# Patient Record
Sex: FEMALE | Race: WHITE | NOT HISPANIC OR LATINO | Employment: FULL TIME | URBAN - METROPOLITAN AREA
[De-identification: names, ages, dates, MRNs, and addresses within clinical notes are randomized per-mention and may not be internally consistent; named-entity substitution may affect disease eponyms.]

---

## 2017-02-05 RX ORDER — GABAPENTIN 300 MG/1
CAPSULE ORAL
Qty: 30 CAPSULE | Refills: 3 | Status: SHIPPED | OUTPATIENT
Start: 2017-02-05 | End: 2017-06-19 | Stop reason: SDUPTHER

## 2017-06-19 RX ORDER — GABAPENTIN 300 MG/1
CAPSULE ORAL
Qty: 30 CAPSULE | Refills: 3 | Status: SHIPPED | OUTPATIENT
Start: 2017-06-19 | End: 2017-10-23 | Stop reason: SDUPTHER

## 2017-08-11 RX ORDER — CITALOPRAM 20 MG/1
TABLET, FILM COATED ORAL
Qty: 90 TABLET | Refills: 3 | Status: SHIPPED | OUTPATIENT
Start: 2017-08-11 | End: 2018-08-21 | Stop reason: SDUPTHER

## 2017-10-25 RX ORDER — GABAPENTIN 300 MG/1
CAPSULE ORAL
Qty: 30 CAPSULE | Refills: 3 | Status: SHIPPED | OUTPATIENT
Start: 2017-10-25 | End: 2018-03-01 | Stop reason: SDUPTHER

## 2017-11-07 ENCOUNTER — HOSPITAL ENCOUNTER (OUTPATIENT)
Dept: RADIOLOGY | Facility: HOSPITAL | Age: 57
Discharge: HOME | End: 2017-11-07
Attending: SPECIALIST
Payer: COMMERCIAL

## 2017-11-07 DIAGNOSIS — Z12.31 VISIT FOR SCREENING MAMMOGRAM: ICD-10-CM

## 2017-11-07 DIAGNOSIS — Z12.31 VISIT FOR SCREENING MAMMOGRAM: Primary | ICD-10-CM

## 2017-11-07 PROCEDURE — 77067 SCR MAMMO BI INCL CAD: CPT | Mod: TC

## 2018-02-28 ENCOUNTER — TELEPHONE (OUTPATIENT)
Dept: FAMILY MEDICINE | Facility: CLINIC | Age: 58
End: 2018-02-28

## 2018-02-28 DIAGNOSIS — L71.9 ROSACEA: ICD-10-CM

## 2018-02-28 DIAGNOSIS — Z00.00 WELLNESS EXAMINATION: Primary | ICD-10-CM

## 2018-02-28 NOTE — TELEPHONE ENCOUNTER
----- Message from Dejah Oliveira sent at 2/28/2018  1:35 PM CST -----  Patient needs wellness blood work orders. Going to [facility] to have done prior to appt on 3/28/18

## 2018-03-01 RX ORDER — GABAPENTIN 300 MG/1
CAPSULE ORAL
Qty: 90 CAPSULE | Refills: 3 | Status: SHIPPED | OUTPATIENT
Start: 2018-03-01 | End: 2019-03-11 | Stop reason: SDUPTHER

## 2018-03-14 ENCOUNTER — OFFICE VISIT (OUTPATIENT)
Dept: FAMILY MEDICINE | Facility: CLINIC | Age: 58
End: 2018-03-14
Payer: COMMERCIAL

## 2018-03-14 VITALS
HEIGHT: 66 IN | DIASTOLIC BLOOD PRESSURE: 70 MMHG | HEART RATE: 107 BPM | TEMPERATURE: 99 F | WEIGHT: 152.13 LBS | SYSTOLIC BLOOD PRESSURE: 110 MMHG | BODY MASS INDEX: 24.45 KG/M2 | OXYGEN SATURATION: 98 %

## 2018-03-14 DIAGNOSIS — R06.83 SNORES: ICD-10-CM

## 2018-03-14 DIAGNOSIS — F41.9 ANXIETY: ICD-10-CM

## 2018-03-14 DIAGNOSIS — G47.33 OSA (OBSTRUCTIVE SLEEP APNEA): ICD-10-CM

## 2018-03-14 DIAGNOSIS — J32.9 RECURRENT SINUSITIS: Primary | ICD-10-CM

## 2018-03-14 DIAGNOSIS — R51.9 PERSISTENT HEADACHES: ICD-10-CM

## 2018-03-14 DIAGNOSIS — J30.1 CHRONIC ALLERGIC RHINITIS DUE TO POLLEN, UNSPECIFIED SEASONALITY: ICD-10-CM

## 2018-03-14 DIAGNOSIS — R53.83 FATIGUE, UNSPECIFIED TYPE: ICD-10-CM

## 2018-03-14 PROCEDURE — 99214 OFFICE O/P EST MOD 30 MIN: CPT | Mod: S$GLB,,, | Performed by: FAMILY MEDICINE

## 2018-03-14 RX ORDER — LORATADINE 10 MG/1
10 TABLET ORAL DAILY
Qty: 90 TABLET | Refills: 3 | COMMUNITY
Start: 2018-03-14 | End: 2018-06-26

## 2018-03-14 RX ORDER — BUTALBITAL, ACETAMINOPHEN AND CAFFEINE 50; 325; 40 MG/1; MG/1; MG/1
TABLET ORAL
COMMUNITY
Start: 2018-02-21 | End: 2018-03-14 | Stop reason: SDUPTHER

## 2018-03-14 RX ORDER — ESTROGENS, CONJUGATED 0.62 MG/1
0.62 TABLET, FILM COATED ORAL DAILY
COMMUNITY
Start: 2018-03-01 | End: 2023-04-03

## 2018-03-14 RX ORDER — FLUTICASONE PROPIONATE 50 MCG
2 SPRAY, SUSPENSION (ML) NASAL DAILY
Qty: 1 BOTTLE | Refills: 12 | Status: SHIPPED | OUTPATIENT
Start: 2018-03-14 | End: 2018-08-01 | Stop reason: SDUPTHER

## 2018-03-14 RX ORDER — MONTELUKAST SODIUM 10 MG/1
10 TABLET ORAL NIGHTLY
Qty: 30 TABLET | Refills: 5 | Status: SHIPPED | OUTPATIENT
Start: 2018-03-14 | End: 2018-03-29 | Stop reason: ALTCHOICE

## 2018-03-14 RX ORDER — FLUCONAZOLE 150 MG/1
TABLET ORAL
COMMUNITY
Start: 2018-02-21 | End: 2018-03-29 | Stop reason: ALTCHOICE

## 2018-03-14 RX ORDER — BUTALBITAL, ACETAMINOPHEN AND CAFFEINE 50; 325; 40 MG/1; MG/1; MG/1
1 TABLET ORAL 3 TIMES DAILY PRN
Qty: 30 TABLET | Refills: 1 | Status: SHIPPED | OUTPATIENT
Start: 2018-03-14 | End: 2019-06-11

## 2018-03-14 NOTE — PROGRESS NOTES
Subjective:      Patient ID: Verito Perez is a 57 y.o. female.    Chief Complaint: Cough; Fever; Sore Throat; Sinus Problem; and Headache    Sick, tired, headaches; acute sinus recureent; has gone to urgent  twice, abx, shots, pain face and back of head; ears itchy, sneeze some; teeth pain; worse this time of year, occurs other times too; tired; sleeps good; occ tired; snores; no sleep tests; recroded by ; nose patent, no dry mouth, able to stay awake;       Cough   Associated symptoms include a fever, headaches and a sore throat.   Fever    Associated symptoms include coughing, headaches and a sore throat.   Sore Throat    Associated symptoms include coughing and headaches.   Sinus Problem   Associated symptoms include coughing, headaches and a sore throat.   Headache    Associated symptoms include coughing, a fever and a sore throat.     Review of Systems   Constitutional: Positive for fever.   HENT: Positive for sore throat.    Respiratory: Positive for cough.    Cardiovascular: Negative.    Gastrointestinal: Negative.    Endocrine: Negative.    Genitourinary: Negative.    Musculoskeletal: Negative.    Neurological: Positive for headaches.   Psychiatric/Behavioral: Negative.    All other systems reviewed and are negative.    Objective:     Physical Exam   Constitutional: She is oriented to person, place, and time. She appears well-developed and well-nourished.   HENT:   Head: Normocephalic.   Serous otitis   Eyes: Conjunctivae and EOM are normal. Pupils are equal, round, and reactive to light.   Neck: Normal range of motion. Neck supple.   Cardiovascular: Normal rate, regular rhythm and normal heart sounds.    Pulmonary/Chest: Effort normal and breath sounds normal.   Musculoskeletal: Normal range of motion.   Neurological: She is alert and oriented to person, place, and time. She has normal reflexes.   Skin: Skin is warm and dry.   Psychiatric: She has a normal mood and affect. Her behavior is normal.  Judgment and thought content normal.   Nursing note and vitals reviewed.    Assessment:     1. Recurrent sinusitis    2. Chronic allergic rhinitis due to pollen, unspecified seasonality    3. Fatigue, unspecified type    4. Persistent headaches    5. Snores    6. Anxiety    7. TESSA (obstructive sleep apnea)      Plan:     New Prescriptions    FLUTICASONE (FLONASE) 50 MCG/ACTUATION NASAL SPRAY    2 sprays (100 mcg total) by Each Nare route once daily.    LORATADINE (CLARITIN) 10 MG TABLET    Take 1 tablet (10 mg total) by mouth once daily.    MONTELUKAST (SINGULAIR) 10 MG TABLET    Take 1 tablet (10 mg total) by mouth every evening.     Discontinued Medications    SOOLANTRA 1 % CREA        ZENATANE 40 MG CAPSULE    Take 1 capsule by mouth every other day.     Modified Medications    Modified Medication Previous Medication    BUTALBITAL-ACETAMINOPHEN-CAFFEINE -40 MG (FIORICET, ESGIC) -40 MG PER TABLET butalbital-acetaminophen-caffeine -40 mg (FIORICET, ESGIC) -40 mg per tablet       Take 1 tablet by mouth 3 (three) times daily as needed for Pain.           Recurrent sinusitis  -     Ambulatory referral to ENT    Chronic allergic rhinitis due to pollen, unspecified seasonality  -     CT Sinuses without Contrast; Future; Expected date: 03/14/2018  -     Ambulatory referral to ENT    Fatigue, unspecified type  -     Ambulatory referral to ENT    Persistent headaches  -     Ambulatory referral to ENT    Snores  -     Ambulatory referral to ENT    Anxiety    TESSA (obstructive sleep apnea)    Other orders  -     Cancel: Tdap Vaccine  -     butalbital-acetaminophen-caffeine -40 mg (FIORICET, ESGIC) -40 mg per tablet; Take 1 tablet by mouth 3 (three) times daily as needed for Pain.  Dispense: 30 tablet; Refill: 1  -     fluticasone (FLONASE) 50 mcg/actuation nasal spray; 2 sprays (100 mcg total) by Each Nare route once daily.  Dispense: 1 Bottle; Refill: 12  -     loratadine (CLARITIN) 10 mg  tablet; Take 1 tablet (10 mg total) by mouth once daily.  Dispense: 90 tablet; Refill: 3  -     montelukast (SINGULAIR) 10 mg tablet; Take 1 tablet (10 mg total) by mouth every evening.  Dispense: 30 tablet; Refill: 5

## 2018-03-20 ENCOUNTER — TELEPHONE (OUTPATIENT)
Dept: FAMILY MEDICINE | Facility: CLINIC | Age: 58
End: 2018-03-20

## 2018-03-20 NOTE — TELEPHONE ENCOUNTER
----- Message from Dejah Oliveira sent at 3/20/2018  1:09 PM CDT -----  Has a really bad cough. Constant cough w/runny nose. Taking OTC Mucinex DM along with OTC Claritin. Wants to know if you can prescribe something or give suggestion as to a different OTC medication to take?    [facility]

## 2018-03-21 RX ORDER — CODEINE PHOSPHATE AND GUAIFENESIN 10; 100 MG/5ML; MG/5ML
10 SOLUTION ORAL EVERY 4 HOURS PRN
Qty: 240 ML | Refills: 0 | Status: SHIPPED | OUTPATIENT
Start: 2018-03-21 | End: 2018-03-29 | Stop reason: ALTCHOICE

## 2018-03-21 RX ORDER — METHYLPREDNISOLONE 4 MG/1
TABLET ORAL
Qty: 1 PACKAGE | Refills: 0 | Status: SHIPPED | OUTPATIENT
Start: 2018-03-21 | End: 2018-03-29 | Stop reason: ALTCHOICE

## 2018-03-21 NOTE — TELEPHONE ENCOUNTER
I left message with details for the pt to cont otc claritin  And that cough syrup and dose pack called to [facility]

## 2018-03-23 ENCOUNTER — HOSPITAL ENCOUNTER (OUTPATIENT)
Dept: RADIOLOGY | Facility: HOSPITAL | Age: 58
Discharge: HOME | End: 2018-03-23
Attending: FAMILY MEDICINE
Payer: COMMERCIAL

## 2018-03-23 DIAGNOSIS — J30.1 CHRONIC ALLERGIC RHINITIS DUE TO POLLEN, UNSPECIFIED SEASONALITY: ICD-10-CM

## 2018-03-23 PROCEDURE — 70486 CT MAXILLOFACIAL W/O DYE: CPT | Mod: TC,PO

## 2018-03-26 ENCOUNTER — TELEPHONE (OUTPATIENT)
Dept: FAMILY MEDICINE | Facility: CLINIC | Age: 58
End: 2018-03-26

## 2018-03-26 NOTE — TELEPHONE ENCOUNTER
----- Message from Sammy Partida MD sent at 3/25/2018  6:33 PM CDT -----  Polyp in right maxillary sinus; needs to see ENT

## 2018-03-29 ENCOUNTER — OFFICE VISIT (OUTPATIENT)
Dept: FAMILY MEDICINE | Facility: CLINIC | Age: 58
End: 2018-03-29
Payer: COMMERCIAL

## 2018-03-29 VITALS
TEMPERATURE: 98 F | WEIGHT: 152.75 LBS | HEART RATE: 76 BPM | HEIGHT: 66 IN | OXYGEN SATURATION: 100 % | SYSTOLIC BLOOD PRESSURE: 110 MMHG | BODY MASS INDEX: 24.55 KG/M2 | DIASTOLIC BLOOD PRESSURE: 70 MMHG

## 2018-03-29 DIAGNOSIS — Z00.00 WELLNESS EXAMINATION: Primary | ICD-10-CM

## 2018-03-29 PROCEDURE — 99213 OFFICE O/P EST LOW 20 MIN: CPT | Mod: 25,S$GLB,, | Performed by: FAMILY MEDICINE

## 2018-03-29 PROCEDURE — 90715 TDAP VACCINE 7 YRS/> IM: CPT | Mod: S$GLB,,, | Performed by: FAMILY MEDICINE

## 2018-03-29 PROCEDURE — 90471 IMMUNIZATION ADMIN: CPT | Mod: S$GLB,,, | Performed by: FAMILY MEDICINE

## 2018-03-29 NOTE — PROGRESS NOTES
Subjective:      Patient ID: Verito Perez is a 57 y.o. female.    Chief Complaint: Annual Exam    Her for wellness, still slightly congested sees ENT 4/18/2018. Pt denies sinus pressure or headaches at this time. Pt denies dysuria or increased urinary frequency. Reviewed labs with patient      Review of Systems   Constitutional: Negative.    HENT: Positive for congestion.    Respiratory: Positive for cough.    Cardiovascular: Negative.    Gastrointestinal: Negative.    Endocrine: Negative.    Genitourinary: Negative.    Musculoskeletal: Negative.    Psychiatric/Behavioral: Negative.    All other systems reviewed and are negative.    Objective:     Physical Exam   Constitutional: She is oriented to person, place, and time. She appears well-developed and well-nourished.   HENT:   Head: Normocephalic.   Eyes: Conjunctivae and EOM are normal. Pupils are equal, round, and reactive to light.   Neck: Normal range of motion. Neck supple.   Cardiovascular: Normal rate, regular rhythm and normal heart sounds.    Pulmonary/Chest: Effort normal and breath sounds normal.   Abdominal: Soft. Bowel sounds are normal.   Musculoskeletal: Normal range of motion.   Neurological: She is alert and oriented to person, place, and time. She has normal reflexes.   Skin: Skin is warm and dry.   Psychiatric: She has a normal mood and affect. Her behavior is normal. Judgment and thought content normal.   Nursing note and vitals reviewed.    Assessment:     1. Wellness examination      Plan:     New Prescriptions    No medications on file     Discontinued Medications    FLUCONAZOLE (DIFLUCAN) 150 MG TAB        GUAIFENESIN-CODEINE 100-10 MG/5 ML (TUSSI-ORGANIDIN NR)  MG/5 ML SYRUP    Take 10 mLs by mouth every 4 (four) hours as needed for Cough.    METHYLPREDNISOLONE (MEDROL DOSEPACK) 4 MG TABLET    use as directed    MONTELUKAST (SINGULAIR) 10 MG TABLET    Take 1 tablet (10 mg total) by mouth every evening.     Modified Medications     No medications on file       Wellness examination  Comments:  labs reviewed and tetnus ordered    Other orders  -     Tdap Vaccine      RTC prn  Copy of labs provided to patient

## 2018-03-30 LAB
1,25(OH)2D SERPL-MCNC: 68 PG/ML (ref 18–72)
1,25(OH)2D2 SERPL-MCNC: <8 PG/ML
1,25(OH)2D3 SERPL-MCNC: 68 PG/ML
ALBUMIN SERPL-MCNC: 3.9 G/DL (ref 3.6–5.1)
ALBUMIN/GLOB SERPL: 1.1 (CALC) (ref 1–2.5)
ALP SERPL-CCNC: 76 U/L (ref 33–130)
ALT SERPL-CCNC: 14 U/L (ref 6–29)
APPEARANCE UR: ABNORMAL
AST SERPL-CCNC: 14 U/L (ref 10–35)
BASOPHILS # BLD AUTO: 22 CELLS/UL (ref 0–200)
BASOPHILS NFR BLD AUTO: 0.3 %
BILIRUB SERPL-MCNC: 0.3 MG/DL (ref 0.2–1.2)
BILIRUB UR QL STRIP: NEGATIVE
BUN SERPL-MCNC: 18 MG/DL (ref 7–25)
BUN/CREAT SERPL: NORMAL (CALC) (ref 6–22)
CALCIUM SERPL-MCNC: 9.4 MG/DL (ref 8.6–10.4)
CHLORIDE SERPL-SCNC: 105 MMOL/L (ref 98–110)
CHOLEST SERPL-MCNC: 153 MG/DL
CHOLEST/HDLC SERPL: 2.3 (CALC)
CO2 SERPL-SCNC: 29 MMOL/L (ref 20–31)
COLOR UR: YELLOW
CREAT SERPL-MCNC: 0.85 MG/DL (ref 0.5–1.05)
EOSINOPHIL # BLD AUTO: 73 CELLS/UL (ref 15–500)
EOSINOPHIL NFR BLD AUTO: 1 %
ERYTHROCYTE [DISTWIDTH] IN BLOOD BY AUTOMATED COUNT: 12.4 % (ref 11–15)
GFR SERPL CREATININE-BSD FRML MDRD: 77 ML/MIN/1.73M2
GLOBULIN SER CALC-MCNC: 3.5 G/DL (CALC) (ref 1.9–3.7)
GLUCOSE SERPL-MCNC: 97 MG/DL (ref 65–99)
GLUCOSE UR QL STRIP: NEGATIVE
HCT VFR BLD AUTO: 46.3 % (ref 35–45)
HCV AB S/CO SERPL IA: 0.04
HCV AB SERPL QL IA: NORMAL
HDLC SERPL-MCNC: 67 MG/DL
HGB BLD-MCNC: 15 G/DL (ref 11.7–15.5)
HGB UR QL STRIP: NEGATIVE
KETONES UR QL STRIP: NEGATIVE
LDLC SERPL CALC-MCNC: 69 MG/DL (CALC)
LEUKOCYTE ESTERASE UR QL STRIP: ABNORMAL
LYMPHOCYTES # BLD AUTO: 2314 CELLS/UL (ref 850–3900)
LYMPHOCYTES NFR BLD AUTO: 31.7 %
MCH RBC QN AUTO: 27.8 PG (ref 27–33)
MCHC RBC AUTO-ENTMCNC: 32.4 G/DL (ref 32–36)
MCV RBC AUTO: 85.9 FL (ref 80–100)
MONOCYTES # BLD AUTO: 518 CELLS/UL (ref 200–950)
MONOCYTES NFR BLD AUTO: 7.1 %
NEUTROPHILS # BLD AUTO: 4373 CELLS/UL (ref 1500–7800)
NEUTROPHILS NFR BLD AUTO: 59.9 %
NITRITE UR QL STRIP: NEGATIVE
NONHDLC SERPL-MCNC: 86 MG/DL (CALC)
PH UR STRIP: 5.5 [PH] (ref 5–8)
PLATELET # BLD AUTO: 222 THOUSAND/UL (ref 140–400)
PMV BLD REES-ECKER: 12.7 FL (ref 7.5–12.5)
POTASSIUM SERPL-SCNC: 4.6 MMOL/L (ref 3.5–5.3)
PROT SERPL-MCNC: 7.4 G/DL (ref 6.1–8.1)
PROT UR QL STRIP: NEGATIVE
RBC # BLD AUTO: 5.39 MILLION/UL (ref 3.8–5.1)
SODIUM SERPL-SCNC: 141 MMOL/L (ref 135–146)
SP GR UR STRIP: 1.02 (ref 1–1.03)
TRIGL SERPL-MCNC: 83 MG/DL
TSH SERPL-ACNC: 2.72 MIU/L
WBC # BLD AUTO: 7.3 THOUSAND/UL (ref 3.8–10.8)

## 2018-04-18 ENCOUNTER — OFFICE VISIT (OUTPATIENT)
Dept: OTOLARYNGOLOGY | Facility: CLINIC | Age: 58
End: 2018-04-18
Payer: COMMERCIAL

## 2018-04-18 VITALS
HEART RATE: 77 BPM | SYSTOLIC BLOOD PRESSURE: 110 MMHG | WEIGHT: 153.69 LBS | TEMPERATURE: 98 F | HEIGHT: 66 IN | DIASTOLIC BLOOD PRESSURE: 81 MMHG | BODY MASS INDEX: 24.7 KG/M2

## 2018-04-18 DIAGNOSIS — J30.89 CHRONIC NON-SEASONAL ALLERGIC RHINITIS, UNSPECIFIED TRIGGER: ICD-10-CM

## 2018-04-18 DIAGNOSIS — J32.8 OTHER CHRONIC SINUSITIS: Primary | ICD-10-CM

## 2018-04-18 DIAGNOSIS — J33.8 MAXILLARY SINUS POLYP: ICD-10-CM

## 2018-04-18 DIAGNOSIS — J34.3 HYPERTROPHY OF INFERIOR NASAL TURBINATE: ICD-10-CM

## 2018-04-18 DIAGNOSIS — J34.2 NASAL SEPTAL DEVIATION: ICD-10-CM

## 2018-04-18 PROCEDURE — 99999 PR PBB SHADOW E&M-EST. PATIENT-LVL III: CPT | Mod: PBBFAC,,, | Performed by: OTOLARYNGOLOGY

## 2018-04-18 PROCEDURE — 99244 OFF/OP CNSLTJ NEW/EST MOD 40: CPT | Mod: 25,S$GLB,, | Performed by: OTOLARYNGOLOGY

## 2018-04-18 PROCEDURE — 31231 NASAL ENDOSCOPY DX: CPT | Mod: S$GLB,,, | Performed by: OTOLARYNGOLOGY

## 2018-04-18 NOTE — PROCEDURES
Nasal/sinus endoscopy  Date/Time: 4/18/2018 11:03 AM  Performed by: BABAK HUERTA  Authorized by: BABAK HUERTA     Consent Done?:  Yes (Verbal)    PROCEDURE NOTE:  Nasal endoscopy   Preprocedure diagnosis:  Chronic sinusitis  Postprocedure diangosis:  Same  Complications:  None  Blood Loss:  None    Procedure in detail:  After verbal consent was obtained, the patient's nasal cavity was anesthesized using topical 1%lidocaine and Neosynepherine.  A rigid 0 degree endoscope was placed in first the right, then the left nasal cavity. There is nasal septal deviation to the left with 80% obstruction.  The inferior and middle turbinates were examined, and found to be edematous and polypoid bilaterally.  The middle meatus and maxillary antrum was also examined, and found to be narrowed bilaterally.  No purulent drainage or masses seen.  The patient tolerated the procedure well and there were no complications.

## 2018-04-18 NOTE — PROGRESS NOTES
Chief Complaint   Patient presents with    Other     pt had CT scan done on 3/14/18, showed polyp in right maxillary sinus, pt referred by Dr Partida to be seen by ENT   .    HPI:     Verito Perez is a 57 y.o. female who presents for evaluation of a several year history of nasal congestion and postnasal drip. She describes worsening over the last 2 months.  She has been on 3 rounds of antibiotics in the last 2 months without resolution of her symptoms.   She describes difficulty breathing at night. There is bilateral nasal obstruction. She does use sinus rinses or nasal sprays. She admits to midface pain and pressure.  She admits to rhinorrhea and postnasal drip. There is not maxillary tooth pain. She  admits to headaches.  She has not had sinus or nasal surgery. There is no history of sinonasal trauma.      Past Medical History:   Diagnosis Date    Acne     Celiac disease     Rosacea      Social History     Social History    Marital status:      Spouse name: N/A    Number of children: N/A    Years of education: N/A     Occupational History    Not on file.     Social History Main Topics    Smoking status: Never Smoker    Smokeless tobacco: Never Used    Alcohol use Yes      Comment: occ    Drug use: Unknown    Sexual activity: Not on file     Other Topics Concern    Not on file     Social History Narrative    No narrative on file     Past Surgical History:   Procedure Laterality Date    COLONOSCOPY  4/4/13    HYSTERECTOMY      TONSILLECTOMY       Family History   Problem Relation Age of Onset    Cancer Maternal Grandmother      thyroid           Review of Systems  General: negative for chills, fever or weight loss  Psychological: negative for mood changes or depression  Ophthalmic: negative for blurry vision, photophobia or eye pain  ENT: see HPI  Respiratory: no cough, shortness of breath, or wheezing  Cardiovascular: no chest pain or dyspnea on exertion  Gastrointestinal: no  abdominal pain, change in bowel habits, or black/ bloody stools  Musculoskeletal: negative for gait disturbance or muscular weakness  Neurological: no syncope or seizures; no ataxia  Dermatological: negative for puritis,  rash and jaundice  Hematologic/lymphatic: no easy bruising, no new lumps or bumps      Physical Exam:    Vitals:    04/18/18 1000   BP: 110/81   Pulse: 77   Temp: 97.7 °F (36.5 °C)       Constitutional: Well appearing / communicating without difficutly.  NAD.  Eyes: EOM I Bilaterally  Head/Face: Normocephalic.  Negative paranasal sinus pressure/tenderness.  Salivary glands WNL.  House Brackmann I Bilaterally.    Right Ear: Auricle normal appearance. External Auditory Canal within normal limits,TM w/o masses/lesions/perforations. TM mobility noted.   Left Ear: Auricle normal appearance. External Auditory Canal WNL,TM w/o masses/lesions/perforations. TM mobility noted.  Nose: Nasal septal deviation. Inferior Turbinates 3+ bilaterally. No septal perforation. No masses/lesions. External nasal skin appears normal without masses/lesions.  Oral Cavity: Gingiva/lips within normal limits.  Dentition/gingiva healthy appearing. Mucus membranes moist. Floor of mouth soft, no masses palpated. Oral Tongue mobile. Hard Palate appears normal.    Oropharynx: Base of tongue appears normal. No masses/lesions noted. Tonsillar fossa/pharyngeal wall without lesions. Posterior oropharynx WNL.  Soft palate without masses. Midline uvula.   Neck/Lymphatic: No LAD I-VI bilaterally.  No thyromegaly.  No masses noted on exam.    Mirror laryngoscopy/nasopharyngoscopy: Active gag reflex.  Unable to perform.    Neuro/Psychiatric: AOx3.  Normal mood and affect.   Cardiovascular: Normal carotid pulses bilaterally, no increasing jugular venous distention noted at cervical region bilaterally.    Respiratory: Normal respiratory effort, no stridor, no retractions noted.      See separate procedure note for nasal endoscopy.     CT sinus  3/23/2018: Mucosal thickening is seen within the ethmoid air cells and bilateral maxillary sinuses. Large polyp or retention cyst is seen within the right maxillary sinus.      Assessment:    ICD-10-CM ICD-9-CM    1. Other chronic sinusitis J32.8 473.8    2. Nasal septal deviation J34.2 470    3. Hypertrophy of inferior nasal turbinate J34.3 478.0    4. Chronic non-seasonal allergic rhinitis, unspecified trigger J30.89 477.9      The primary encounter diagnosis was Other chronic sinusitis. Diagnoses of Nasal septal deviation, Hypertrophy of inferior nasal turbinate, and Chronic non-seasonal allergic rhinitis, unspecified trigger were also pertinent to this visit.      Plan:  No orders of the defined types were placed in this encounter.    Continue Flonase and Claritin.    She would benefit from endoscopic sinus surgery for the treatment of her condition.  This would include the ethmoid and maxillary sinuses and would be bilateral. Septoplasty and  Inferior turbinate reduction would be included.  I discussed the risks, benefits and alternatives to surgery with the patient, as well as the expected postoperative course.  I gave her the opportunity to ask questions and I answered all of them.  I provided relevant printed information on her condition for her to review at home.  Same-day discharge is anticipated.  She will need evaluation in the pre-anesthesia clinic. She will also need a CT sinuses with Stealth protocol prior to surgery.  The surgery will be scheduled in the near future.  She will need to return for a postoperative visit 1 week after surgery.    Thank you kindly for allowing me to participate in the patient's care.     Yuliya Hawkins MD

## 2018-04-18 NOTE — PATIENT INSTRUCTIONS
INSTRUCTIONS TO FOLLOW AFTER SINUS SURGERY  DR. Tauzin - [facility]      Your first office visit with Dr. Bolden after surgery should have been already scheduled.  If you dont know when it is, call Dr. Bolden's nurse Harmony at [phone].    ACTIVITY:    Sleep on your back with the head of the bead elevated, up on 2-3 pillows, or in a recliner for the first 3 to 5 days. This will help with swelling.     After surgery you may have a lot of nasal drainage. This is normal. Do not wipe, touch, or dab your nose. You may breathe through your nose if youre able but avoid inhaling forcibly. Let all drainage fall on your mustache dressing and change it as needed.    You may shower 24 hours after surgery.    If you use CPAP or BiPAP to sleep at night, you should wait at least 48 hours before resuming use.  Dr. Bolden will advise you when it is safe to do this.    DRESSINGS:    Change the mustache dressing under your nose as needed. (If unsure what this dressing is or how to do this, ask your doctor or nurse before you leave the clinic/hospital.) You may have pinkish-red drainage for 2-3 days.    In certain cases you may have gauze packing inside your nostrils.  If so, these will turn from white to red from the drainage. This is normal so do not be alarmed. Do not touch or pull at the packing. The packing will be removed by your doctor at your first post-op visit.     You may also have a dissolvable stent or dissolvable sponge placed into the sinuses during surgery.  These usually do not need to be removed unless you are told otherwise by Dr. Bolden.  You may notice small fragments of these items come out of your nose in the weeks following surgery.    MEDICATIONS:  After surgery, you are generally sent home with prescription for an antibiotic, a pain medication, and an anti-nausea medication.     Start your antibiotics when you get home from surgery and take them until they are all gone.  It is best to take them with  food to prevent an upset stomach.    Most people need prescription pain medication for the first few days after surgery.  If you find that this is not necessary, you may use over-the-counter Tylenol (Acetaminophen) instead.    Avoid Aspirin, Motrin (Ibuprofen), Advil, Aleve and Vitamin E for 1 week before surgery and 1 week after surgery. There are many other medications to avoid as well due to the fact they act as blood thinners.      Some people have problems with bowel movements after surgery. If you have NOT had a bowel movement 3-5 days after surgery, go to your local pharmacy and purchase an over the counter stool softener such as COLACE. You can also ask the pharmacist for his or her recommendation. If you still do not have a bowel movement after starting the softener, please call Dr. Ortega office.    You will need these over-the-counter medications after surgery:    Saline Sinus Rinse ([employer] brand):  You will use this to rinse out your nose and sinuses after surgery.  Begin doing this two days after surgery, unless instructed otherwise by Dr. Bolden.  You should do this 2 times a day, following the instructions on the box.    Afrin (regular strength): Only use if you have nasal congestion or bleeding. Use 2 times per day for 3 days, stop for 1 day, continue 2 times per day for 3 days, then stop completely.  NOTE:  You may not need to do this at all.      RESTRICTED ACTIVITIES AFTER SURGERY:    Do NOT blow your nose for 2 weeks. If you have to sneeze or cough, do so with your mouth open.   AVOID all heavy lifting, straining or bending for 2 weeks.   AVOID any sexual activity for 2 weeks after surgery.  AVOID semi-contact sports or vigorous exercising for 3-4 weeks. Dr. Bolden will let you know when you are cleared to resume exercise.  No Swimming for 3-4 weeks.  No Flying for 2 weeks.    Do NOT operate a motor vehicle or any type of heavy machinery within 24 hours of taking pain medication.  DO NOT  smoke or be around smokers.  AVOID irritating substances such as dust, chalk, harsh chemicals, and allergic triggers.    DIET:    Avoid hot and spicy foods, or salty soups for 1 week after surgery.  Begin with bland foods the day after surgery and advance to your regular diet as tolerated.  It is not necessary to take only soft food unless you are recovering from tonsil surgery.  Drink plenty of fluids (water is best).   Avoid alcoholic and caffeinated beverages for 1 week after surgery.

## 2018-04-19 ENCOUNTER — TELEPHONE (OUTPATIENT)
Dept: OTOLARYNGOLOGY | Facility: CLINIC | Age: 58
End: 2018-04-19

## 2018-04-19 DIAGNOSIS — J32.8 OTHER CHRONIC SINUSITIS: Primary | ICD-10-CM

## 2018-06-04 PROBLEM — Z00.00 WELLNESS EXAMINATION: Status: RESOLVED | Noted: 2018-02-28 | Resolved: 2018-06-04

## 2018-06-05 ENCOUNTER — OFFICE VISIT (OUTPATIENT)
Dept: OTOLARYNGOLOGY | Facility: CLINIC | Age: 58
End: 2018-06-05
Payer: COMMERCIAL

## 2018-06-05 ENCOUNTER — TELEPHONE (OUTPATIENT)
Dept: OTOLARYNGOLOGY | Facility: CLINIC | Age: 58
End: 2018-06-05

## 2018-06-05 ENCOUNTER — CLINICAL SUPPORT (OUTPATIENT)
Dept: LAB | Facility: HOSPITAL | Age: 58
End: 2018-06-05
Attending: OTOLARYNGOLOGY
Payer: COMMERCIAL

## 2018-06-05 ENCOUNTER — HOSPITAL ENCOUNTER (OUTPATIENT)
Dept: PREADMISSION TESTING | Facility: HOSPITAL | Age: 58
Discharge: HOME | End: 2018-06-05
Attending: OTOLARYNGOLOGY
Payer: COMMERCIAL

## 2018-06-05 ENCOUNTER — ANESTHESIA EVENT (OUTPATIENT)
Dept: SURGERY | Facility: HOSPITAL | Age: 58
End: 2018-06-05
Payer: COMMERCIAL

## 2018-06-05 VITALS
DIASTOLIC BLOOD PRESSURE: 76 MMHG | HEIGHT: 66 IN | BODY MASS INDEX: 24.85 KG/M2 | HEART RATE: 56 BPM | TEMPERATURE: 98 F | RESPIRATION RATE: 16 BRPM | SYSTOLIC BLOOD PRESSURE: 137 MMHG | OXYGEN SATURATION: 100 % | WEIGHT: 154.63 LBS

## 2018-06-05 VITALS
HEIGHT: 66 IN | BODY MASS INDEX: 24.96 KG/M2 | HEART RATE: 52 BPM | TEMPERATURE: 97 F | DIASTOLIC BLOOD PRESSURE: 60 MMHG | SYSTOLIC BLOOD PRESSURE: 110 MMHG | WEIGHT: 155.31 LBS

## 2018-06-05 DIAGNOSIS — J34.3 HYPERTROPHY OF INFERIOR NASAL TURBINATE: ICD-10-CM

## 2018-06-05 DIAGNOSIS — Z01.818 PRE-OP EXAM: Primary | ICD-10-CM

## 2018-06-05 DIAGNOSIS — Z01.818 PRE-OP EXAM: ICD-10-CM

## 2018-06-05 DIAGNOSIS — J33.8 MAXILLARY SINUS POLYP: ICD-10-CM

## 2018-06-05 DIAGNOSIS — J01.10 SUBACUTE FRONTAL SINUSITIS: ICD-10-CM

## 2018-06-05 DIAGNOSIS — J32.8 OTHER CHRONIC SINUSITIS: Primary | ICD-10-CM

## 2018-06-05 DIAGNOSIS — J34.2 NASAL SEPTAL DEVIATION: ICD-10-CM

## 2018-06-05 PROCEDURE — 99213 OFFICE O/P EST LOW 20 MIN: CPT | Mod: 25,S$GLB,, | Performed by: OTOLARYNGOLOGY

## 2018-06-05 PROCEDURE — 93005 ELECTROCARDIOGRAM TRACING: CPT

## 2018-06-05 PROCEDURE — 31231 NASAL ENDOSCOPY DX: CPT | Mod: S$GLB,,, | Performed by: OTOLARYNGOLOGY

## 2018-06-05 PROCEDURE — 93010 EKG 12-LEAD: ICD-10-PCS | Mod: ,,, | Performed by: INTERNAL MEDICINE

## 2018-06-05 PROCEDURE — 99999 PR PBB SHADOW E&M-EST. PATIENT-LVL III: CPT | Mod: PBBFAC,,, | Performed by: OTOLARYNGOLOGY

## 2018-06-05 PROCEDURE — 93010 ELECTROCARDIOGRAM REPORT: CPT | Mod: ,,, | Performed by: INTERNAL MEDICINE

## 2018-06-05 PROCEDURE — 3008F BODY MASS INDEX DOCD: CPT | Mod: CPTII,S$GLB,, | Performed by: OTOLARYNGOLOGY

## 2018-06-05 RX ORDER — SODIUM CHLORIDE, SODIUM LACTATE, POTASSIUM CHLORIDE, CALCIUM CHLORIDE 600; 310; 30; 20 MG/100ML; MG/100ML; MG/100ML; MG/100ML
INJECTION, SOLUTION INTRAVENOUS CONTINUOUS
Status: CANCELLED | OUTPATIENT
Start: 2018-06-05

## 2018-06-05 RX ORDER — IBUPROFEN 200 MG
200 TABLET ORAL EVERY 6 HOURS PRN
COMMUNITY
End: 2023-04-03

## 2018-06-05 RX ORDER — LIDOCAINE HYDROCHLORIDE 10 MG/ML
1 INJECTION, SOLUTION EPIDURAL; INFILTRATION; INTRACAUDAL; PERINEURAL ONCE
Status: CANCELLED | OUTPATIENT
Start: 2018-06-05 | End: 2018-06-05

## 2018-06-05 RX ORDER — AZITHROMYCIN 250 MG/1
TABLET, FILM COATED ORAL
Qty: 6 TABLET | Refills: 0 | Status: SHIPPED | OUTPATIENT
Start: 2018-06-05 | End: 2018-07-03

## 2018-06-05 RX ORDER — AMOXICILLIN AND CLAVULANATE POTASSIUM 875; 125 MG/1; MG/1
1 TABLET, FILM COATED ORAL 2 TIMES DAILY
Qty: 28 TABLET | Refills: 0 | Status: SHIPPED | OUTPATIENT
Start: 2018-06-05 | End: 2018-06-19

## 2018-06-05 RX ORDER — METHYLPREDNISOLONE 4 MG/1
TABLET ORAL
Qty: 1 PACKAGE | Refills: 0 | Status: SHIPPED | OUTPATIENT
Start: 2018-06-05 | End: 2018-06-26

## 2018-06-05 NOTE — PROGRESS NOTES
Chief Complaint   Patient presents with    Follow-up    Nasal Congestion     slightly improved, post nasal drip    Headache     left side   .    HPI:     Verito Perez is a 57 y.o. female who presents for evaluation of a several year history of nasal congestion and postnasal drip. She describes worsening over the last 2 months.  She has been on 3 rounds of antibiotics in the last 2 months without resolution of her symptoms.   She describes difficulty breathing at night. There is bilateral nasal obstruction. She does use sinus rinses or nasal sprays. She admits to midface pain and pressure.  She admits to rhinorrhea and postnasal drip. There is not maxillary tooth pain. She  admits to headaches.  She has not had sinus or nasal surgery. There is no history of sinonasal trauma.    Interval HPI:  Mrs. Perez presents in follow up for CRS. She reports that she has had worsening nasal congestion, post-nasal drip, and left sided headache. She feels the headache on the left frontal and maxillary region.  It has gradually worsenened.  She has used over the counter advil with some relief. She also has upcoming ESS scheduled.       Past Medical History:   Diagnosis Date    Acne     Celiac disease     Rosacea      Social History     Social History    Marital status:      Spouse name: N/A    Number of children: N/A    Years of education: N/A     Occupational History    Not on file.     Social History Main Topics    Smoking status: Never Smoker    Smokeless tobacco: Never Used    Alcohol use Yes      Comment: occ    Drug use: Unknown    Sexual activity: Not on file     Other Topics Concern    Not on file     Social History Narrative    No narrative on file     Past Surgical History:   Procedure Laterality Date    COLONOSCOPY  4/4/13    HYSTERECTOMY      TONSILLECTOMY       Family History   Problem Relation Age of Onset    Cancer Maternal Grandmother         thyroid           Review of  Systems  General: negative for chills, fever or weight loss  Psychological: negative for mood changes or depression  Ophthalmic: negative for blurry vision, photophobia or eye pain  ENT: see HPI  Respiratory: no cough, shortness of breath, or wheezing  Cardiovascular: no chest pain or dyspnea on exertion  Gastrointestinal: no abdominal pain, change in bowel habits, or black/ bloody stools  Musculoskeletal: negative for gait disturbance or muscular weakness  Neurological: no syncope or seizures; no ataxia  Dermatological: negative for puritis,  rash and jaundice  Hematologic/lymphatic: no easy bruising, no new lumps or bumps      Physical Exam:    Vitals:    06/05/18 1332   BP: 110/60   Pulse: (!) 52   Temp: 97.1 °F (36.2 °C)       Constitutional: Well appearing / communicating without difficutly.  NAD.  Eyes: EOM I Bilaterally  Head/Face: Normocephalic.  Negative paranasal sinus pressure/tenderness.  Salivary glands WNL.  House Brackmann I Bilaterally.    Right Ear: Auricle normal appearance. External Auditory Canal within normal limits,TM w/o masses/lesions/perforations. TM mobility noted.   Left Ear: Auricle normal appearance. External Auditory Canal WNL,TM w/o masses/lesions/perforations. TM mobility noted.  Nose: Nasal septal deviation. Inferior Turbinates 3+ bilaterally. No septal perforation. No masses/lesions. External nasal skin appears normal without masses/lesions.  Oral Cavity: Gingiva/lips within normal limits.  Dentition/gingiva healthy appearing. Mucus membranes moist. Floor of mouth soft, no masses palpated. Oral Tongue mobile. Hard Palate appears normal.    Oropharynx: Base of tongue appears normal. No masses/lesions noted. Tonsillar fossa/pharyngeal wall without lesions. Posterior oropharynx WNL.  Soft palate without masses. Midline uvula.   Neck/Lymphatic: No LAD I-VI bilaterally.  No thyromegaly.  No masses noted on exam.    Mirror laryngoscopy/nasopharyngoscopy: Active gag reflex.  Unable to  perform.    Neuro/Psychiatric: AOx3.  Normal mood and affect.   Cardiovascular: Normal carotid pulses bilaterally, no increasing jugular venous distention noted at cervical region bilaterally.    Respiratory: Normal respiratory effort, no stridor, no retractions noted.      See separate procedure note for nasal endoscopy.     CT sinus 3/23/2018: Mucosal thickening is seen within the ethmoid air cells and bilateral maxillary sinuses. Large polyp or retention cyst is seen within the right maxillary sinus.      Assessment:    ICD-10-CM ICD-9-CM    1. Other chronic sinusitis J32.8 473.8 CT Medtronic Sinuses without   2. Maxillary sinus polyp J33.8 471.8    3. Nasal septal deviation J34.2 470    4. Hypertrophy of inferior nasal turbinate J34.3 478.0      The primary encounter diagnosis was Other chronic sinusitis. Diagnoses of Maxillary sinus polyp, Nasal septal deviation, and Hypertrophy of inferior nasal turbinate were also pertinent to this visit.      Plan:  Orders Placed This Encounter   Procedures    CT Medtronic Sinuses without     Continue Flonase and Claritin.    Augmentin and Medrol dose arlene prescriptions given today.     We again discussed that she would benefit from endoscopic sinus surgery for the treatment of her condition.  This would include the ethmoid and maxillary sinuses and would be bilateral. Septoplasty and  Inferior turbinate reduction would be included.  I discussed the risks, benefits and alternatives to surgery with the patient, as well as the expected postoperative course.  I gave her the opportunity to ask questions and I answered all of them.  I provided relevant printed information on her condition for her to review at home.      Yuliya Hawkins MD

## 2018-06-05 NOTE — DISCHARGE INSTRUCTIONS
Your surgery is scheduled for  6/18/2018     Please report to Procedure Check-in Room on the 2nd floor at  06:45 A.M.          INSTRUCTIONS IMPORTANT!!!  ¨ Do not eat or drink after 12 midnight-including water. OK to brush teeth, no   gum, candy or mints!    ¨ Take only these medicines with a small swallow of water-morning of surgery:          ____  Please remove all jewelry, including piercings and leave at home.  ____  No money or valuables needed. Please leave at home.  ____  If going home the same day, arrange for a ride home. You will not be able to             drive if Anesthesia was used.  ____  Wear loose fitting clothing. Allow for dressings, bandages.  ____  Stop Aspirin, Ibuprofen, Motrin and Aleve at least 3-5 days before surgery, unless otherwise instructed by your doctor, or the nurse.            You MAY use Tylenol/acetaminophen until day of surgery.  ____ Stop taking any Fish Oil supplements or any Vitamins that contain Vitamin E at least 5 days prior to surgery.  ____  If you take diabetic medication, do not take am of surgery unless instructed by Doctor.  ____  Call MD for temperature above 101 degrees.  ____ Do Not wear your contact lenses the day of your procedure.  You may wear your glasses.        I have read or had read and explained to me, and understand the above information.  Additional comments or instructions:  For additional questions call [phone]            Anesthesia: General Anesthesia     You are watched continuously during your procedure by your anesthesia provider.     Youre due to have surgery. During surgery, youll be given medicine called anesthesia or anesthetic. This will keep you comfortable and pain-free. Your anesthesia provider will use general anesthesia.  What is general anesthesia?  General anesthesia puts you into a state like deep sleep. It goes into the bloodstream (IV anesthetics), into the lungs (gas anesthetics), or both. You feel nothing during the procedure.  You will not remember it. During the procedure, the anesthesia provider monitors you continuously. He or she checks your heart rate and rhythm, blood pressure, breathing, and blood oxygen.  · IV anesthetics. IV anesthetics are given through an IV line in your arm. Theyre often given first. This is so you are asleep before a gas anesthetic is started. Some kinds of IV anesthetics relieve pain. Others relax you. Your doctor will decide which kind is best in your case.  · Gas anesthetics. Gas anesthetics are breathed into the lungs. They are often used to keep you asleep. They can be given through a facemask or a tube placed in your larynx or trachea (breathing tube).  ¨ If you have a facemask, your anesthesia provider will most likely place it over your nose and mouth while youre still awake. Youll breathe oxygen through the mask as your IV anesthetic is started. Gas anesthetic may be added through the mask.  ¨ If you have a tube in the larynx or trachea, it will be inserted into your throat after youre asleep.  Anesthesia tools and medicines  You will likely have:  · IV anesthetics. These are put into an IV line into your bloodstream.  · Gas anesthetics. You breathe these anesthetics into your lungs, where they pass into your bloodstream.  · Pulse oximeter. This is a small clip that is attached to the end of your finger. This measures your blood oxygen level.  · Electrocardiography leads (electrodes). These are small sticky pads that are placed on your chest. They record your heart rate and rhythm.  · Blood pressure cuff. This reads your blood pressure.  Risks and possible complications  General anesthesia has some risks. These include:  · Breathing problems  · Nausea and vomiting  · Sore throat or hoarseness (usually temporary)  · Allergic reaction to the anesthetic  · Irregular heartbeat (rare)  · Cardiac arrest (rare)   Anesthesia safety  · Follow all instructions you are given for how long not to eat or drink  before your procedure.  · Be sure your doctor knows what medicines and drugs you take. This includes over-the-counter medicines, herbs, supplements, alcohol or other drugs. You will be asked when those were last taken.  · Have an adult family member or friend drive you home after the procedure.  · For the first 24 hours after your surgery:  ¨ Do not drive or use heavy equipment.  ¨ Do not make important decisions or sign legal documents. If important decisions or signing legal documents is necessary during the first 24 hours after surgery, have a trusted family member or spouse act on your behalf.  ¨ Avoid alcohol.  ¨ Have a responsible adult stay with you. He or she can watch for problems and help keep you safe.  Date Last Reviewed: 12/1/2016 © 2000-2017 [employer]. [address] [place] [address], [place] [address]. All rights reserved. This information is not intended as a substitute for professional medical care. Always follow your healthcare professional's instructions.

## 2018-06-05 NOTE — TELEPHONE ENCOUNTER
Spoke with patient she states when she went  her Zpack had a note to call providers office and questioning the reasoning for this. I called [facility] spoke with pharmacist asking reasoning for the note to call providers office before taking the Zpack. Pharmacist states patient is taking Celexa wanting to make sure patient doesn't have history of arrhthymias, asked patient and she reports no she doesn't. Pharmacist was notified and said taking the two medications will be fine. Let patient know and she verbalized understanding.

## 2018-06-05 NOTE — ANESTHESIA PREPROCEDURE EVALUATION
06/05/2018  Verito Perez is a 57 y.o., female is scheduled for    SINUS SURGERY FUNCTIONAL ENDOSCOPIC WITH, NAVIGATION with SEPTOPLASTY and CAUTERIZATION OF TURBINATES under [facility] on 6/18/2018.    Review of patient's allergies indicates:   Allergen Reactions    Sulfa (sulfonamide antibiotics)        Past Surgical History:   Procedure Laterality Date    COLONOSCOPY  4/4/13    HYSTERECTOMY      TONSILLECTOMY       .  Past Medical History:   Diagnosis Date    Acne     Celiac disease     Rosacea      Patient Active Problem List   Diagnosis    Rosacea    Anxiety    Subacute frontal sinusitis    Other chronic sinusitis     Wt Readings from Last 3 Encounters:   06/18/18 70.3 kg (155 lb)   06/05/18 70.1 kg (154 lb 10.4 oz)   06/05/18 70.5 kg (155 lb 5 oz)     Temp Readings from Last 3 Encounters:   06/18/18 36.7 °C (98.1 °F) (Skin)   06/05/18 36.8 °C (98.2 °F) (Oral)   06/05/18 36.2 °C (97.1 °F) (Oral)     BP Readings from Last 3 Encounters:   06/18/18 127/79   06/05/18 137/76   06/05/18 110/60     Pulse Readings from Last 3 Encounters:   06/18/18 (!) 59   06/05/18 (!) 56   06/05/18 (!) 52           Anesthesia Evaluation    I have reviewed the Patient Summary Reports.    I have reviewed the Nursing Notes.   I have reviewed the Medications.   Steroids Taken In Past Year: Prednisone    Review of Systems  Anesthesia Hx:  No problems with previous Anesthesia  History of prior surgery of interest to airway management or planning: Previous anesthesia: General, MAC  colonoscopy with MAC.   Denies Personal Hx of Anesthesia complications.   Social:  Non-Smoker, Social Alcohol Use    Hematology/Oncology:  Hematology Normal        EENT/Dental:   chronic allergic rhinitis Currently being treated for sinus infection with Medrol dose arlene and Augementin; denies fever/chills  Denies ear symptoms  Nasal Symptoms:  (chronic sinus pressure)  of runny nose   Cardiovascular:   Exercise tolerance: good Denies Hypertension.  Denies Dysrhythmias.   Denies Angina.        Pulmonary:   Denies Shortness of breath.    Renal/:  Renal/ Normal     Hepatic/GI:  Hepatic/GI Normal    Neurological:   Headaches (sinus pressure)    Endocrine:  Endocrine Normal    Psych:   anxiety depression        Lab Results   Component Value Date    WBC 7.3 03/26/2018    HGB 15.0 03/26/2018    HCT 46.3 (H) 03/26/2018    MCV 85.9 03/26/2018     03/26/2018       Chemistry        Component Value Date/Time     06/05/2018 1526    K 4.4 06/05/2018 1526     06/05/2018 1526    CO2 28 06/05/2018 1526    BUN 19 06/05/2018 1526    CREATININE 0.9 06/05/2018 1526     (H) 06/05/2018 1526        Component Value Date/Time    CALCIUM 9.6 06/05/2018 1526    ALKPHOS 76 03/26/2018 0704    AST 14 03/26/2018 0704    ALT 14 03/26/2018 0704    BILITOT 0.3 03/26/2018 0704    ESTGFRAFRICA >60 06/05/2018 1526    EGFRNONAA >60 06/05/2018 1526            Physical Exam  General:  Well nourished    Airway/Jaw/Neck:  Airway Findings: Mouth Opening: Normal Tongue: Normal  General Airway Assessment: Adult  Mallampati: II  TM Distance: Normal, at least 6 cm        Eyes/Ears/Nose:  Eyes/Ears/Nose Findings: Nasal congestion    Dental:  Dental Findings:   Chest/Lungs:  Chest/Lungs Clear    Heart/Vascular:  Heart Findings: Normal Heart murmur: negative    Abdomen:  Abdomen Findings: Normal      Mental Status:  Mental Status Findings: Normal        Anesthesia Plan  Type of Anesthesia, risks & benefits discussed:  Anesthesia Type:  general  Patient's Preference:   Intra-op Monitoring Plan:   Intra-op Monitoring Plan Comments:   Post Op Pain Control Plan:   Post Op Pain Control Plan Comments:   Induction:   IV  Beta Blocker:  Patient is not currently on a Beta-Blocker (No further documentation required).       Informed Consent: Patient understands risks and agrees with  Anesthesia plan.  Questions answered.   ASA Score: 2     Day of Surgery Review of History & Physical: I have interviewed and examined the patient. I have reviewed the patient's H&P dated:  There are no significant changes.  H&P update referred to the provider.  H&P completed by Anesthesiologist.   Anesthesia Plan Notes: Anesthesia consent will be obtained prior to procedure on 6/18/2018.        Ready For Surgery From Anesthesia Perspective.

## 2018-06-08 ENCOUNTER — HOSPITAL ENCOUNTER (OUTPATIENT)
Dept: RADIOLOGY | Facility: HOSPITAL | Age: 58
Discharge: HOME | End: 2018-06-08
Attending: OTOLARYNGOLOGY
Payer: COMMERCIAL

## 2018-06-08 DIAGNOSIS — J32.8 OTHER CHRONIC SINUSITIS: ICD-10-CM

## 2018-06-08 PROCEDURE — 70486 CT MAXILLOFACIAL W/O DYE: CPT | Mod: TC,PO

## 2018-06-11 NOTE — PROCEDURES
Procedures  PROCEDURE NOTE:  Nasal endoscopy   Preprocedure diagnosis:  Chronic sinusitis  Postprocedure diangosis:  Same  Complications:  None  Blood Loss:  None    Procedure in detail:  After verbal consent was obtained, the patient's nasal cavity was anesthesized using topical 1%lidocaine and Neosynepherine.  A rigid 0 degree endoscope was placed in first the right, then the left nasal cavity. There is nasal septal deviation to the left with 80% obstruction.  The inferior and middle turbinates were examined, and found to be edematous and polypoid bilaterally.  The middle meatus and maxillary antrum was also examined, and found to be narrowed bilaterally.  No purulent drainage or masses seen.  The patient tolerated the procedure well and there were no complications.

## 2018-06-18 ENCOUNTER — SURGERY (OUTPATIENT)
Age: 58
End: 2018-06-18

## 2018-06-18 ENCOUNTER — ANESTHESIA (OUTPATIENT)
Dept: SURGERY | Facility: HOSPITAL | Age: 58
End: 2018-06-18
Payer: COMMERCIAL

## 2018-06-18 ENCOUNTER — HOSPITAL ENCOUNTER (OUTPATIENT)
Facility: HOSPITAL | Age: 58
Discharge: HOME | End: 2018-06-18
Attending: OTOLARYNGOLOGY | Admitting: OTOLARYNGOLOGY
Payer: COMMERCIAL

## 2018-06-18 VITALS
HEIGHT: 66 IN | TEMPERATURE: 98 F | WEIGHT: 155 LBS | OXYGEN SATURATION: 97 % | BODY MASS INDEX: 24.91 KG/M2 | HEART RATE: 66 BPM | SYSTOLIC BLOOD PRESSURE: 155 MMHG | DIASTOLIC BLOOD PRESSURE: 85 MMHG | RESPIRATION RATE: 18 BRPM

## 2018-06-18 DIAGNOSIS — J32.8 OTHER CHRONIC SINUSITIS: Primary | ICD-10-CM

## 2018-06-18 PROCEDURE — 63600175 PHARM REV CODE 636 W HCPCS: Performed by: NURSE ANESTHETIST, CERTIFIED REGISTERED

## 2018-06-18 PROCEDURE — 30520 REPAIR OF NASAL SEPTUM: CPT | Mod: ,,, | Performed by: OTOLARYNGOLOGY

## 2018-06-18 PROCEDURE — 31267 ENDOSCOPY MAXILLARY SINUS: CPT | Mod: 50,51,, | Performed by: OTOLARYNGOLOGY

## 2018-06-18 PROCEDURE — 63600175 PHARM REV CODE 636 W HCPCS: Performed by: ANESTHESIOLOGY

## 2018-06-18 PROCEDURE — 37000008 HC ANESTHESIA 1ST 15 MINUTES: Performed by: OTOLARYNGOLOGY

## 2018-06-18 PROCEDURE — 27201423 OPTIME MED/SURG SUP & DEVICES STERILE SUPPLY: Performed by: OTOLARYNGOLOGY

## 2018-06-18 PROCEDURE — 31254 NSL/SINS NDSC W/PRTL ETHMDCT: CPT | Mod: 50,51,, | Performed by: OTOLARYNGOLOGY

## 2018-06-18 PROCEDURE — 25000003 PHARM REV CODE 250: Performed by: ANESTHESIOLOGY

## 2018-06-18 PROCEDURE — C1763 CONN TISS, NON-HUMAN: HCPCS | Performed by: OTOLARYNGOLOGY

## 2018-06-18 PROCEDURE — 71000039 HC RECOVERY, EACH ADD'L HOUR: Performed by: OTOLARYNGOLOGY

## 2018-06-18 PROCEDURE — 25000003 PHARM REV CODE 250: Performed by: OTOLARYNGOLOGY

## 2018-06-18 PROCEDURE — 30140 RESECT INFERIOR TURBINATE: CPT | Mod: 50,51,, | Performed by: OTOLARYNGOLOGY

## 2018-06-18 PROCEDURE — 88305 TISSUE EXAM BY PATHOLOGIST: CPT | Performed by: PATHOLOGY

## 2018-06-18 PROCEDURE — 25000003 PHARM REV CODE 250: Performed by: NURSE ANESTHETIST, CERTIFIED REGISTERED

## 2018-06-18 PROCEDURE — 36000710: Performed by: OTOLARYNGOLOGY

## 2018-06-18 PROCEDURE — 37000009 HC ANESTHESIA EA ADD 15 MINS: Performed by: OTOLARYNGOLOGY

## 2018-06-18 PROCEDURE — 88311 DECALCIFY TISSUE: CPT | Mod: 26,,, | Performed by: PATHOLOGY

## 2018-06-18 PROCEDURE — 36000711: Performed by: OTOLARYNGOLOGY

## 2018-06-18 PROCEDURE — 88305 TISSUE EXAM BY PATHOLOGIST: CPT | Mod: 26,,, | Performed by: PATHOLOGY

## 2018-06-18 PROCEDURE — 71000015 HC POSTOP RECOV 1ST HR: Performed by: OTOLARYNGOLOGY

## 2018-06-18 PROCEDURE — 61782 SCAN PROC CRANIAL EXTRA: CPT | Mod: ,,, | Performed by: OTOLARYNGOLOGY

## 2018-06-18 PROCEDURE — 71000033 HC RECOVERY, INTIAL HOUR: Performed by: OTOLARYNGOLOGY

## 2018-06-18 PROCEDURE — 25000003 PHARM REV CODE 250: Performed by: NURSE PRACTITIONER

## 2018-06-18 PROCEDURE — 71000016 HC POSTOP RECOV ADDL HR: Performed by: OTOLARYNGOLOGY

## 2018-06-18 RX ORDER — SODIUM CHLORIDE 0.9 % (FLUSH) 0.9 %
3 SYRINGE (ML) INJECTION
Status: DISCONTINUED | OUTPATIENT
Start: 2018-06-18 | End: 2018-06-18 | Stop reason: HOSPADM

## 2018-06-18 RX ORDER — HYDROCODONE BITARTRATE AND ACETAMINOPHEN 10; 325 MG/1; MG/1
1 TABLET ORAL EVERY 4 HOURS PRN
Status: CANCELLED | OUTPATIENT
Start: 2018-06-18

## 2018-06-18 RX ORDER — FENTANYL CITRATE 50 UG/ML
INJECTION, SOLUTION INTRAMUSCULAR; INTRAVENOUS
Status: DISCONTINUED | OUTPATIENT
Start: 2018-06-18 | End: 2018-06-18

## 2018-06-18 RX ORDER — DEXAMETHASONE SODIUM PHOSPHATE 4 MG/ML
INJECTION, SOLUTION INTRA-ARTICULAR; INTRALESIONAL; INTRAMUSCULAR; INTRAVENOUS; SOFT TISSUE
Status: DISCONTINUED | OUTPATIENT
Start: 2018-06-18 | End: 2018-06-18

## 2018-06-18 RX ORDER — SUCCINYLCHOLINE CHLORIDE 20 MG/ML
INJECTION INTRAMUSCULAR; INTRAVENOUS
Status: DISCONTINUED | OUTPATIENT
Start: 2018-06-18 | End: 2018-06-18

## 2018-06-18 RX ORDER — MIDAZOLAM HYDROCHLORIDE 1 MG/ML
INJECTION, SOLUTION INTRAMUSCULAR; INTRAVENOUS
Status: DISCONTINUED | OUTPATIENT
Start: 2018-06-18 | End: 2018-06-18

## 2018-06-18 RX ORDER — OXYMETAZOLINE HCL 0.05 %
2 SPRAY, NON-AEROSOL (ML) NASAL ONCE
Status: COMPLETED | OUTPATIENT
Start: 2018-06-18 | End: 2018-06-18

## 2018-06-18 RX ORDER — SODIUM CHLORIDE, SODIUM LACTATE, POTASSIUM CHLORIDE, CALCIUM CHLORIDE 600; 310; 30; 20 MG/100ML; MG/100ML; MG/100ML; MG/100ML
INJECTION, SOLUTION INTRAVENOUS CONTINUOUS
Status: DISCONTINUED | OUTPATIENT
Start: 2018-06-18 | End: 2018-06-18 | Stop reason: HOSPADM

## 2018-06-18 RX ORDER — DIPHENHYDRAMINE HYDROCHLORIDE 50 MG/ML
25 INJECTION INTRAMUSCULAR; INTRAVENOUS EVERY 6 HOURS PRN
Status: DISCONTINUED | OUTPATIENT
Start: 2018-06-18 | End: 2018-06-18 | Stop reason: SDUPTHER

## 2018-06-18 RX ORDER — LIDOCAINE HYDROCHLORIDE 10 MG/ML
1 INJECTION, SOLUTION EPIDURAL; INFILTRATION; INTRACAUDAL; PERINEURAL ONCE
Status: DISCONTINUED | OUTPATIENT
Start: 2018-06-18 | End: 2018-06-18

## 2018-06-18 RX ORDER — PROMETHAZINE HYDROCHLORIDE 25 MG/ML
12.5 INJECTION, SOLUTION INTRAMUSCULAR; INTRAVENOUS ONCE AS NEEDED
Status: COMPLETED | OUTPATIENT
Start: 2018-06-18 | End: 2018-06-18

## 2018-06-18 RX ORDER — ROCURONIUM BROMIDE 10 MG/ML
INJECTION, SOLUTION INTRAVENOUS
Status: DISCONTINUED | OUTPATIENT
Start: 2018-06-18 | End: 2018-06-18

## 2018-06-18 RX ORDER — LIDOCAINE HYDROCHLORIDE AND EPINEPHRINE 10; 10 MG/ML; UG/ML
INJECTION, SOLUTION INFILTRATION; PERINEURAL
Status: DISCONTINUED | OUTPATIENT
Start: 2018-06-18 | End: 2018-06-18 | Stop reason: HOSPADM

## 2018-06-18 RX ORDER — ONDANSETRON 2 MG/ML
4 INJECTION INTRAMUSCULAR; INTRAVENOUS DAILY PRN
Status: DISCONTINUED | OUTPATIENT
Start: 2018-06-18 | End: 2018-06-18 | Stop reason: SDUPTHER

## 2018-06-18 RX ORDER — ONDANSETRON 4 MG/1
4 TABLET, ORALLY DISINTEGRATING ORAL EVERY 8 HOURS PRN
Qty: 12 TABLET | Refills: 0 | Status: SHIPPED | OUTPATIENT
Start: 2018-06-18 | End: 2018-08-01

## 2018-06-18 RX ORDER — SODIUM CHLORIDE 0.9 % (FLUSH) 0.9 %
3 SYRINGE (ML) INJECTION EVERY 8 HOURS
Status: DISCONTINUED | OUTPATIENT
Start: 2018-06-18 | End: 2018-06-18 | Stop reason: HOSPADM

## 2018-06-18 RX ORDER — HYDROCODONE BITARTRATE AND ACETAMINOPHEN 5; 325 MG/1; MG/1
1 TABLET ORAL EVERY 4 HOURS PRN
Status: CANCELLED | OUTPATIENT
Start: 2018-06-18

## 2018-06-18 RX ORDER — PROPOFOL 10 MG/ML
VIAL (ML) INTRAVENOUS
Status: DISCONTINUED | OUTPATIENT
Start: 2018-06-18 | End: 2018-06-18

## 2018-06-18 RX ORDER — OXYCODONE HYDROCHLORIDE 5 MG/1
5 TABLET ORAL
Status: DISCONTINUED | OUTPATIENT
Start: 2018-06-18 | End: 2018-06-18 | Stop reason: SDUPTHER

## 2018-06-18 RX ORDER — MORPHINE SULFATE 4 MG/ML
2 INJECTION, SOLUTION INTRAMUSCULAR; INTRAVENOUS
Status: DISCONTINUED | OUTPATIENT
Start: 2018-06-18 | End: 2018-06-18 | Stop reason: SDUPTHER

## 2018-06-18 RX ORDER — ONDANSETRON 8 MG/1
8 TABLET, ORALLY DISINTEGRATING ORAL ONCE
Status: CANCELLED | OUTPATIENT
Start: 2018-06-18 | End: 2018-06-18

## 2018-06-18 RX ORDER — MUPIROCIN 20 MG/G
OINTMENT TOPICAL
Status: DISCONTINUED | OUTPATIENT
Start: 2018-06-18 | End: 2018-06-18 | Stop reason: HOSPADM

## 2018-06-18 RX ORDER — ONDANSETRON 2 MG/ML
INJECTION INTRAMUSCULAR; INTRAVENOUS
Status: DISCONTINUED | OUTPATIENT
Start: 2018-06-18 | End: 2018-06-18

## 2018-06-18 RX ORDER — MORPHINE SULFATE 4 MG/ML
2 INJECTION, SOLUTION INTRAMUSCULAR; INTRAVENOUS
Status: DISCONTINUED | OUTPATIENT
Start: 2018-06-18 | End: 2018-06-18 | Stop reason: HOSPADM

## 2018-06-18 RX ORDER — OXYMETAZOLINE HCL 0.05 %
SPRAY, NON-AEROSOL (ML) NASAL
Status: DISCONTINUED | OUTPATIENT
Start: 2018-06-18 | End: 2018-06-18 | Stop reason: HOSPADM

## 2018-06-18 RX ORDER — SODIUM CHLORIDE 0.9 % (FLUSH) 0.9 %
3 SYRINGE (ML) INJECTION EVERY 10 MIN PRN
Status: DISCONTINUED | OUTPATIENT
Start: 2018-06-18 | End: 2018-06-18 | Stop reason: HOSPADM

## 2018-06-18 RX ORDER — DIPHENHYDRAMINE HYDROCHLORIDE 50 MG/ML
25 INJECTION INTRAMUSCULAR; INTRAVENOUS EVERY 6 HOURS PRN
Status: DISCONTINUED | OUTPATIENT
Start: 2018-06-18 | End: 2018-06-18 | Stop reason: HOSPADM

## 2018-06-18 RX ORDER — LIDOCAINE HYDROCHLORIDE 10 MG/ML
1 INJECTION, SOLUTION EPIDURAL; INFILTRATION; INTRACAUDAL; PERINEURAL ONCE
Status: DISCONTINUED | OUTPATIENT
Start: 2018-06-18 | End: 2018-06-18 | Stop reason: HOSPADM

## 2018-06-18 RX ORDER — SODIUM CHLORIDE 0.9 % (FLUSH) 0.9 %
3 SYRINGE (ML) INJECTION
Status: DISCONTINUED | OUTPATIENT
Start: 2018-06-18 | End: 2018-06-18 | Stop reason: SDUPTHER

## 2018-06-18 RX ORDER — LIDOCAINE HCL/PF 100 MG/5ML
SYRINGE (ML) INTRAVENOUS
Status: DISCONTINUED | OUTPATIENT
Start: 2018-06-18 | End: 2018-06-18

## 2018-06-18 RX ORDER — HYDROCODONE BITARTRATE AND ACETAMINOPHEN 7.5; 325 MG/1; MG/1
1 TABLET ORAL EVERY 6 HOURS PRN
Qty: 28 TABLET | Refills: 0 | Status: SHIPPED | OUTPATIENT
Start: 2018-06-18 | End: 2018-06-26

## 2018-06-18 RX ORDER — PROMETHAZINE HYDROCHLORIDE 25 MG/ML
INJECTION, SOLUTION INTRAMUSCULAR; INTRAVENOUS
Status: DISCONTINUED
Start: 2018-06-18 | End: 2018-06-18 | Stop reason: HOSPADM

## 2018-06-18 RX ORDER — ONDANSETRON 2 MG/ML
4 INJECTION INTRAMUSCULAR; INTRAVENOUS DAILY PRN
Status: DISCONTINUED | OUTPATIENT
Start: 2018-06-18 | End: 2018-06-18 | Stop reason: HOSPADM

## 2018-06-18 RX ORDER — OXYCODONE HYDROCHLORIDE 5 MG/1
5 TABLET ORAL
Status: DISCONTINUED | OUTPATIENT
Start: 2018-06-18 | End: 2018-06-18 | Stop reason: HOSPADM

## 2018-06-18 RX ADMIN — DEXAMETHASONE SODIUM PHOSPHATE 12 MG: 4 INJECTION, SOLUTION INTRAMUSCULAR; INTRAVENOUS at 10:06

## 2018-06-18 RX ADMIN — PROPOFOL 150 MG: 10 INJECTION, EMULSION INTRAVENOUS at 10:06

## 2018-06-18 RX ADMIN — ROCURONIUM BROMIDE 5 MG: 10 INJECTION, SOLUTION INTRAVENOUS at 10:06

## 2018-06-18 RX ADMIN — OXYMETAZOLINE HYDROCHLORIDE 2 SPRAY: 5 SPRAY NASAL at 09:06

## 2018-06-18 RX ADMIN — PROMETHAZINE HYDROCHLORIDE 12.5 MG: 25 INJECTION INTRAMUSCULAR; INTRAVENOUS at 01:06

## 2018-06-18 RX ADMIN — OXYCODONE HYDROCHLORIDE 5 MG: 5 TABLET ORAL at 02:06

## 2018-06-18 RX ADMIN — SUCCINYLCHOLINE CHLORIDE 120 MG: 20 INJECTION, SOLUTION INTRAMUSCULAR; INTRAVENOUS at 10:06

## 2018-06-18 RX ADMIN — MUPIROCIN 1 G: 20 OINTMENT TOPICAL at 12:06

## 2018-06-18 RX ADMIN — LIDOCAINE HYDROCHLORIDE,EPINEPHRINE BITARTRATE 20 ML: 10; .01 INJECTION, SOLUTION INFILTRATION; PERINEURAL at 09:06

## 2018-06-18 RX ADMIN — DEXTROSE 2 G: 50 INJECTION, SOLUTION INTRAVENOUS at 10:06

## 2018-06-18 RX ADMIN — OXYMETAZOLINE HYDROCHLORIDE 15 ML: 5 SPRAY NASAL at 09:06

## 2018-06-18 RX ADMIN — ONDANSETRON 8 MG: 2 INJECTION, SOLUTION INTRAMUSCULAR; INTRAVENOUS at 12:06

## 2018-06-18 RX ADMIN — MORPHINE SULFATE 2 MG: 4 INJECTION INTRAVENOUS at 01:06

## 2018-06-18 RX ADMIN — FENTANYL CITRATE 150 MCG: 50 INJECTION, SOLUTION INTRAMUSCULAR; INTRAVENOUS at 10:06

## 2018-06-18 RX ADMIN — SODIUM CHLORIDE, SODIUM LACTATE, POTASSIUM CHLORIDE, AND CALCIUM CHLORIDE: .6; .31; .03; .02 INJECTION, SOLUTION INTRAVENOUS at 11:06

## 2018-06-18 RX ADMIN — MIDAZOLAM 2 MG: 1 INJECTION INTRAMUSCULAR; INTRAVENOUS at 09:06

## 2018-06-18 RX ADMIN — LIDOCAINE HYDROCHLORIDE 100 MG: 20 INJECTION, SOLUTION INTRAVENOUS at 10:06

## 2018-06-18 RX ADMIN — FENTANYL CITRATE 100 MCG: 50 INJECTION, SOLUTION INTRAMUSCULAR; INTRAVENOUS at 01:06

## 2018-06-18 RX ADMIN — SODIUM CHLORIDE, SODIUM LACTATE, POTASSIUM CHLORIDE, AND CALCIUM CHLORIDE: .6; .31; .03; .02 INJECTION, SOLUTION INTRAVENOUS at 09:06

## 2018-06-18 RX ADMIN — ONDANSETRON 4 MG: 2 INJECTION INTRAMUSCULAR; INTRAVENOUS at 01:06

## 2018-06-18 NOTE — INTERVAL H&P NOTE
The patient has been examined and the H&P has been reviewed:    I concur with the findings and no changes have occurred since H&P was written.    Anesthesia/Surgery risks, benefits and alternative options discussed and understood by patient/family.          Active Hospital Problems    Diagnosis  POA    Other chronic sinusitis [J32.8]  Yes      Resolved Hospital Problems    Diagnosis Date Resolved POA   No resolved problems to display.

## 2018-06-18 NOTE — TRANSFER OF CARE
"Anesthesia Transfer of Care Note    Patient: Verito Perez    Procedure(s) Performed: Procedure(s) (LRB):  SINUS SURGERY FUNCTIONAL ENDOSCOPIC WITH NAVIGATION (N/A)  SEPTOPLASTY (N/A)  CAUTERIZATION OF TURBINATES (N/A)    Patient location: PACU    Anesthesia Type: general    Transport from OR: Transported from OR on 6-10 L/min O2 by face mask with adequate spontaneous ventilation    Post pain: adequate analgesia    Post assessment: no apparent anesthetic complications and tolerated procedure well    Post vital signs: stable    Level of consciousness: awake    Nausea/Vomiting: no nausea/vomiting    Complications: none    Transfer of care protocol was followed      Last vitals:   Visit Vitals  /80   Pulse 64   Temp 36.4 °C (97.5 °F) (Skin)   Resp 12   Ht 5' 6" (1.676 m)   Wt 70.3 kg (155 lb)   SpO2 95%   BMI 25.02 kg/m²     "

## 2018-06-18 NOTE — PLAN OF CARE
Criteria met for discharge, IV removed, instructions explained,copy given. Denies pain, tolerating po well, voiding without difficulty. Pt and family all verbalize understanding of instructions, have no further questions.Discharged home with family in good condition.

## 2018-06-18 NOTE — PLAN OF CARE
Pt meets PACU discharge criteria. Pt signed out of PACU by Dr Castaneda. Report called to YUKI Maradiaga

## 2018-06-18 NOTE — DISCHARGE INSTRUCTIONS
INSTRUCTIONS TO FOLLOW AFTER SINUS SURGERY  DR. Tauzin - [facility]      Your first office visit with Dr. Bolden after surgery should have been already scheduled.  If you dont know when it is, call Dr. Bolden's nurse Harmony at [phone].    ACTIVITY:    Sleep on your back with the head of the bead elevated, up on 2-3 pillows, or in a recliner for the first 3 to 5 days. This will help with swelling.     After surgery you may have a lot of nasal drainage. This is normal. Do not wipe, touch, or dab your nose. You may breathe through your nose if youre able but avoid inhaling forcibly. Let all drainage fall on your mustache dressing and change it as needed.    You may shower 24 hours after surgery.    If you use CPAP or BiPAP to sleep at night, you should wait at least 48 hours before resuming use.  Dr. Bolden will advise you when it is safe to do this.    DRESSINGS:    Change the mustache dressing under your nose as needed. (If unsure what this dressing is or how to do this, ask your doctor or nurse before you leave the clinic/hospital.) You may have pinkish-red drainage for 2-3 days.    In certain cases you may have gauze packing inside your nostrils.  If so, these will turn from white to red from the drainage. This is normal so do not be alarmed. Do not touch or pull at the packing. The packing will be removed by your doctor at your first post-op visit.     You may also have a dissolvable stent or dissolvable sponge placed into the sinuses during surgery.  These usually do not need to be removed unless you are told otherwise by Dr. Bolden.  You may notice small fragments of these items come out of your nose in the weeks following surgery.    MEDICATIONS:  After surgery, you are generally sent home with prescription for an antibiotic, a pain medication, and an anti-nausea medication.     Start your antibiotics when you get home from surgery and take them until they are all gone.  It is best to take them with  food to prevent an upset stomach.    Most people need prescription pain medication for the first few days after surgery.  If you find that this is not necessary, you may use over-the-counter Tylenol (Acetaminophen) instead.    Avoid Aspirin, Motrin (Ibuprofen), Advil, Aleve and Vitamin E for 1 week before surgery and 1 week after surgery. There are many other medications to avoid as well due to the fact they act as blood thinners.      Some people have problems with bowel movements after surgery. If you have NOT had a bowel movement 3-5 days after surgery, go to your local pharmacy and purchase an over the counter stool softener such as COLACE. You can also ask the pharmacist for his or her recommendation. If you still do not have a bowel movement after starting the softener, please call Dr. Ortega office.    You will need these over-the-counter medications after surgery:    Saline Sinus Rinse ([employer] brand):  You will use this to rinse out your nose and sinuses after surgery.  Begin doing this two days after surgery, unless instructed otherwise by Dr. Bolden.  You should do this 2 times a day, following the instructions on the box.    Afrin (regular strength): Only use if you have nasal congestion or bleeding. Use 2 times per day for 3 days, stop for 1 day, continue 2 times per day for 3 days, then stop completely.  NOTE:  You may not need to do this at all.      RESTRICTED ACTIVITIES AFTER SURGERY:    Do NOT blow your nose for 2 weeks. If you have to sneeze or cough, do so with your mouth open.   AVOID all heavy lifting, straining or bending for 2 weeks.   AVOID any sexual activity for 2 weeks after surgery.  AVOID semi-contact sports or vigorous exercising for 3-4 weeks. Dr. Bolden will let you know when you are cleared to resume exercise.  No Swimming for 3-4 weeks.  No Flying for 2 weeks.    Do NOT operate a motor vehicle or any type of heavy machinery within 24 hours of taking pain medication.  DO NOT  smoke or be around smokers.  AVOID irritating substances such as dust, chalk, harsh chemicals, and allergic triggers.    DIET:    Avoid hot and spicy foods, or salty soups for 1 week after surgery.  Begin with bland foods the day after surgery and advance to your regular diet as tolerated.  It is not necessary to take only soft food unless you are recovering from tonsil surgery.  Drink plenty of fluids (water is best).   Avoid alcoholic and caffeinated beverages for 1 week after surgery.      ANESTHESIA  -For the first 24 hours after surgery:  Do not drive, use heavy equipment, make important decisions, or drink alcohol  -It is normal to feel sleepy for several hours.  Rest until you are more awake.  -Have someone stay with you, if needed.  They can watch for problems and help keep you safe.  -Some possible post anesthesia side effects include: nausea and vomiting, sore throat and hoarseness, sleepiness, and dizziness.    PAIN  -If you have pain after surgery, pain medicine will help you feel better.  Take it as directed, before pain becomes severe.  Most pain relievers taken by mouth need at least 20-30 minutes to start working.  -Do not drive or drink alcohol while taking pain medicine.  -Pain medication can upset your stomach.  Taking them with a little food may help.  -Other ways to help control pain: elevation, ice, and relaxation  -Call your surgeon if still having unmanageable pain an hour after taking pain medicine.  -Pain medicine can cause constipation.  Taking an over-the counter stool softener while on prescription pain medicine and drinking plenty of fluids can prevent this side effect.  -Call your surgeon if you have severe side effects like: breathing problems, trouble waking up, dizziness, confusion, or severe constipation.    NAUSEA  -Some people have nausea after surgery.  This is often because of anesthesia, pain, pain medicine, or the stress of surgery.  -Do not push yourself to eat.  Start off  with clear liquids and soup.  Slowly move to solid foods.  Don't eat fatty, rich, spicy foods at first.  Eat smaller amounts.  -If you develop persistent nausea and vomiting please notify your surgeon immediately.    BLEEDING  -Different types of surgery require different types of care and dressing changes.  It is important to follow all instructions and advice from your surgeon.  Change dressing as directed.  Call your surgeon for any concerns regarding postop bleeding.    SIGNS OF INFECTION  -Signs of infection include: fever, swelling, drainage, and redness  -Notify your surgeon if you have a fever of 100.4 F (38.0 C) or higher.  -Notify your surgeon if you notice redness, swelling, increased pain, pus, or a foul smell at the incision site.        Nasal Surgery: Septoplasty    Youre scheduled to have nasal surgery. The type of nasal surgery youre having is called septoplasty. Read on to learn more about what to expect during this surgery.During surgery, the surgeon may remove cartilage and bone to reshape the deviated septum. After surgery, there is more breathing space. Enough cartilage and bone remain to give the nose support.    What to expect during septoplasty  This surgery repairs a blockage inside the nose caused by a deviated septum. With a deviated septum, there is a problem with the wall that divides the nose into two chambers. A deviated septum may block air coming through one or both nostrils. This makes it harder for you to breathe through your nose. During septoplasty, the surgeon makes incisions inside the nose. Then the surgeon trims, reshapes, moves, or removes cartilage and sometimes bone from the septum.    Risks and possible complications  As with any surgery, nasal surgery has some risks. These include a slight risk of bleeding and infection. Your doctor will discuss any other risks and complications with you.   After septoplasty  After septoplasty, youll be taken to a recovery area or to  your hospital room. Your experience may be as follows:  · You may have packing material inside your nose. This reduces bleeding and promotes healing. You may also have bandages (dressings) on the outside of your nose.  · Its normal to have some mucus and blood drain from your nose. Until packing is removed, you may have to breathe through your mouth.  · You may have some swelling or bruising around the eyelids if a rhinoplasty was also done.  · Expect some throat dryness and irritation.  · Pain medicine will be prescribed as needed. Dont take medicine that contains aspirin or ibuprofen. These can cause increased bleeding.  ·   Follow-up care  Youll need to follow up with your doctor within a week after your surgery. Here is what to expect:  · Any packing, splint, or dressings will probably be removed. You may feel slight discomfort and bleed a little when this is done.  · After the splint or packing is removed, youll most likely breathe better than you did before surgery.  · You may have minor numbness, pain, swelling, and a little stiffness under the tip of the nose.  · In a few days, the inside of your nose may swell and briefly block your breathing. Or a scab or crust may block breathing for a short time. Leave the scab alone. Your doctor can remove it. Using saline (irrigation or aerosol) regularly after surgery helps to reduce the amount of crusting at each visit.  · Contact your healthcare provider if you have any questions or concerns.      Notify Dr. Bolden for any problems or concerns.

## 2018-06-18 NOTE — DISCHARGE SUMMARY
Discharge Note    SUMMARY     Admit Date: 6/18/2018    Discharge Date and Time: 6/18/2018  Attending Physician: Yuliya Hawkins,*     Discharge Provider: Yuliya Hawkins    Final Diagnosis: Post-Op Diagnosis Codes:     * Other chronic sinusitis [J32.8]    Disposition: Home or Self Care    Follow Up/Patient Instructions: Dr. Bolden 1 week    Medications:  Reconciled Home Medications: Current Discharge Medication List      START taking these medications    Details   HYDROcodone-acetaminophen (NORCO) 7.5-325 mg per tablet Take 1 tablet by mouth every 6 (six) hours as needed for Pain.  Qty: 28 tablet, Refills: 0      ondansetron (ZOFRAN-ODT) 4 MG TbDL Take 1 tablet (4 mg total) by mouth every 8 (eight) hours as needed (nausea).  Qty: 12 tablet, Refills: 0         CONTINUE these medications which have NOT CHANGED    Details   amoxicillin-clavulanate 875-125mg (AUGMENTIN) 875-125 mg per tablet Take 1 tablet by mouth 2 (two) times daily.  Qty: 28 tablet, Refills: 0      azithromycin (Z-CALOS) 250 MG tablet Take as directed  Qty: 6 tablet, Refills: 0      butalbital-acetaminophen-caffeine -40 mg (FIORICET, ESGIC) -40 mg per tablet Take 1 tablet by mouth 3 (three) times daily as needed for Pain.  Qty: 30 tablet, Refills: 1      citalopram (CELEXA) 20 MG tablet TAKE 1 TABLET BY MOUTH EVERY DAY  Qty: 90 tablet, Refills: 3      fluticasone (FLONASE) 50 mcg/actuation nasal spray 2 sprays (100 mcg total) by Each Nare route once daily.  Qty: 1 Bottle, Refills: 12      gabapentin (NEURONTIN) 300 MG capsule TAKE 1 CAPSULE BY MOUTH EVERY DAY AT BEDTIME  Qty: 90 capsule, Refills: 3      ibuprofen (ADVIL,MOTRIN) 200 MG tablet Take 200 mg by mouth every 6 (six) hours as needed for Pain.      loratadine (CLARITIN) 10 mg tablet Take 1 tablet (10 mg total) by mouth once daily.  Qty: 90 tablet, Refills: 3      methylPREDNISolone (MEDROL DOSEPACK) 4 mg tablet use as directed  Qty: 1 Package, Refills: 0       PREMARIN 0.625 mg tablet Take 0.625 mg by mouth once daily.              Discharge Procedure Orders  Diet general     Activity as tolerated     Change dressing (specify)   Order Comments: Change dressing prn saturation.     Call MD for:  temperature >100.4     Call MD for:  persistent nausea and vomiting     Call MD for:  severe uncontrolled pain     Call MD for:  difficulty breathing, headache or visual disturbances       Follow-up Information     Yuliya Hawkins MD In 1 week.    Specialty:  Otolaryngology  Contact information:  [address]  [address]  [facility] [address] [phone]

## 2018-06-18 NOTE — OP NOTE
DATE OF OPERATION: 6/18/2018    SURGEON:  Yuliya Bolden MD     ASSISTANT SURGEON:  none     OPERATION:     1. Bilateral image-guided endoscopic anterior ethmoidectomy.  2. Bilateral image-guided endoscopic maxillary antrostomy.  3. Septoplasty  4.. Bilateral inferior turbinate reduction with submucosal resection.     PREOPERATIVE DIAGNOSIS:      1. Chronic rhinosinusitis with polyposis.  2. Hypertrophic turbinates.  3. Nasal septal deviation     POSTOPERATIVE DIAGNOSIS:      1. Chronic rhinosinusitis with polyposis  2. Hypertrophic turbinates.  3. Nasal septal deviation     ANESTHESIA: General.     COMPLICATIONS: None.     ESTIMATED BLOOD LOSS: 25 mL     SPECIMEN: Bilateral sinonasal contents     WOUND EXPECTANCY: Clean-contaminated.    DRESSING: merogel packing  in bilateral middle meatus.  Merocel pack bilaterally.     FINDINGS: Bilateral polypoid and erythematous middle turbinates; saida polyps and polypoid mucosa in bilateral maxillary sinuses which was removed, NSD to the left with bony spur; bilateral inferior tubinate hypertrophy.      INDICATIONS: Chronic rhinosinusitis, not controlled with maximal medical therapy.     I discussed the risks, benefits and alternatives of surgical correction of the chronically obstructed sinuses and associated turbinate hypertrophy with the patient as well as the expected postoperative course. I gave her the opportunity to ask questions and I answered all of them. On the morning of surgery I again met with the patient and reviewed the indications for surgery and she consented to proceed.     DESCRIPTION OF PROCEDURE: The patient was brought to the operating room and placed supine on the operating table. The patient was placed under general anesthesia and intubated. The patient was positioned with a donut under the head and the image-guidance headset for the [employer] Fusion system was applied.  Image-guided navigation was indicated to facilitate exenteration of all ethmoid  cells and the extent of sinusotomy.  The CT scan disc was loaded into the image-guidance system and registered with the patient tracking system according to the 's instructions. The pointer was calibrated and registration was verified using predefined landmarks.  Cottonoid pledgets soaked with neosynepherine  was placed into the nasal cavity bilaterally for mucosal decongestion.  Prophylactic cefazolin was given prior to the surgery start. A time-out was performed to confirm the proper patient, site and procedure.  The CT images were again reviewed prior to surgical start.  The patient was prepped and draped in the usual fashion.  The bed was placed in 20-degree reverse Trendelenberg position.     A 0-degree endoscope was used to examine the nasal cavity.  Local injections of 1% lidocaine with 1:100,000 parts epinephrine were then performed around the middle turbinates bilaterally as well as the inferior turbinate and the sphenopalatine ganglion region bilaterally.     Attention was then turned to the sinuses.  The right  middle turbinate was medialized to with a chris elevator to gain access into the middle meatus.   The uncinate process was then visualized and a sickle knife was used to incise the uncinate process. The uncinate was dissected to its superior attachment and removed using cutting forceps.  A alejandrina bullosa was not present.       After removal of the bone, the natural ostium of the maxillary sinus was visible. The lumen was visualized with a 30-degree scope and entered using a curved suction to confirm the dimension. The antrostomy was enlarged with cutting instruments to include the natural sinus ostium, taking care not to move anterior to the maxillary line so as to avoid injury to the nasolacrimal duct.  Additional bone was removed using forceps.  A maxillary polyp and polypoid tissue  was present within the maxillary sinus.  This was thoroughly removed and sent for pathologic  evaluation.     The ethmoid bulla was entered using a microdebrider and anterior ethmoidectomy was performed.  The roof of the bulla was removed with forceps and the suprabullar recess was exposed.  An Onodi cell was not present.  The mucosa was hypertrophic throughout the sinuses, indicative of chronic inflammation.  Topical hemostatic agents on pledgets were then placed into the ethmoid cavity for hemostasis.     Attention was then turned to the left side of the sinonasal tract.  A similar procedure was performed on this side, including uncinectomy, maxillary antrostomy, anterior ethmoidectomy.      At the conclusion of these procedures, the image-guidance probe was used to verify that all anterior ethmoid cells had been properly opened and that the skull base was visible and that the lamina papyracea had not been traversed on either side.  All sinuses were copiously irrigated with warm normal saline solution.       At this point, approximately 2 cc of 1% lidocaine with epinephrine was injected into the septum bilaterally in the submucoperichondrial plane.  A 15 blade was then used to make a hemitransfixion incision on the left.  The mucoperichondrial flap was then elevated first on the left, and then the incision was carried through the septum to the right and the right flap was elevated in a similar fashion.  Using a swivel knife as well as double action forceps, the deviated portion of the septum was removed.    Next, the head of each inferior turbinate was injected with 0.5 cc of 1% lidocaine with epinephrine and a 15 blade was used to make a small incision at the head of the turbinate.  A Mariposa elevator was used to elevate the erectile tissue of the inferior turbinate.  The micodebrider with the turbinate blade was then used to remove the erectile tissue in the submucous plane.      The anterior septal incision was then closed using a chromic suture, and the bilateral mucoperichondrial flaps were re  approximated using a quilting caopting suture technique with a 4-0 plain gut suture.   Johnson splints were then placed along the septum and secured anteriorly using a 2-0 silk suture.      At this point, the pledgets were all removed.   Merogel packing  was placed into the bilateral ethmoid cavities to stent open the surgical site.  The nasal cavity was bilateral Clemente packs.  Mupirocin ointment was applied to the vestibule bilaterally.  At this point, the headset was removed and the drapes were taken down The patient was turned back toward the anesthesiologist and awakened from anesthesia, extubated and transferred to the recovery room in stable condition.

## 2018-06-18 NOTE — ANESTHESIA POSTPROCEDURE EVALUATION
"Anesthesia Post Evaluation    Patient: Verito Perez    Procedure(s) Performed: Procedure(s) (LRB):  SINUS SURGERY FUNCTIONAL ENDOSCOPIC WITH NAVIGATION (N/A)  SEPTOPLASTY (N/A)  CAUTERIZATION OF TURBINATES (N/A)    Final Anesthesia Type: general  Patient location during evaluation: PACU  Patient participation: Yes- Able to Participate  Level of consciousness: awake and alert, oriented and awake  Post-procedure vital signs: reviewed and stable  Pain management: adequate  Airway patency: patent  PONV status at discharge: No PONV  Anesthetic complications: no      Cardiovascular status: blood pressure returned to baseline  Respiratory status: unassisted and room air  Hydration status: euvolemic  Follow-up not needed.        Visit Vitals  BP (!) 160/82 (BP Location: Left arm, Patient Position: Lying)   Pulse 67   Temp 36.5 °C (97.7 °F)   Resp 18   Ht 5' 6" (1.676 m)   Wt 70.3 kg (155 lb)   SpO2 96%   BMI 25.02 kg/m²       Pain/Jarrod Score: Pain Assessment Performed: Yes (6/18/2018  2:20 PM)  Presence of Pain: denies (6/18/2018  2:20 PM)  Pain Rating Prior to Med Admin: 4 (6/18/2018  2:02 PM)  Jarrod Score: 10 (6/18/2018  2:20 PM)      "

## 2018-06-18 NOTE — PLAN OF CARE
.Patient sitting up in bed, son at bedside. Patient aaox3. Vss. Speaks and understands english and does not require an . Patient does not need security for valuables. Oriented the patient to the room and pre/ post procedure protocol. Verbalized understanding.

## 2018-06-19 ENCOUNTER — TELEPHONE (OUTPATIENT)
Dept: OTOLARYNGOLOGY | Facility: CLINIC | Age: 58
End: 2018-06-19

## 2018-06-19 ENCOUNTER — NURSE TRIAGE (OUTPATIENT)
Dept: ADMINISTRATIVE | Facility: CLINIC | Age: 58
End: 2018-06-19

## 2018-06-19 NOTE — TELEPHONE ENCOUNTER
----- Message from Malorie Francisco sent at 6/19/2018 12:29 PM CDT -----  Contact: [phone]/ self  Patient would like call back regarding congestion in throat and nose. Pt stated when she took medication and drunk water she had a feeling as if she was drowning. I advised pt if airway became restricted to go to ER. Pt stated airway is fine. Please advise.

## 2018-06-19 NOTE — TELEPHONE ENCOUNTER
Spoke with patient she is c/o of feeling congested and she states earlier she felt like her throat was closing up but she reports she is feeling better now since she started drinking water. Told patient the congestion she is feeling is normal due to the packing in her nasals but advised her if she starts feeling her throat closing up and having difficulty breathing or feeling short of breath to go to the ER. Patient verbalized understanding. Patient questioning if she could start using Afrin spray she states she has bleeding on her dressing told patient as per Dr Bolden she may start using the Afrin as directed on the instructions and can start the saline rinse tomorrow. Advised patient if she has anymore questions or concerns to please call the office. Patient verbalized understanding with no questions.

## 2018-06-19 NOTE — TELEPHONE ENCOUNTER
----- Message from Malorie Francisco sent at 6/19/2018 12:29 PM CDT -----  Contact: [phone]/ self  Patient would like call back regarding congestion in throat and nose. Pt stated when she took medication and drunk water she had a feeling as if she was drowning. I advised pt if airway became restricted to go to ER. Pt stated airway is fine. Please advise.

## 2018-06-21 ENCOUNTER — TELEPHONE (OUTPATIENT)
Dept: OTOLARYNGOLOGY | Facility: CLINIC | Age: 58
End: 2018-06-21

## 2018-06-21 NOTE — TELEPHONE ENCOUNTER
----- Message from Anjelica Snyder sent at 6/21/2018  8:50 AM CDT -----  Contact: [phone]/self  Patient requesting to speak with you regarding the symptoms she is experiencing since she had a procedure done on Monday. Please call and advise.

## 2018-06-21 NOTE — TELEPHONE ENCOUNTER
Spoke with patient, told her to continue to elevate the head of her bed at night and it is okay to use the afrin on the packing tonight. Told her per Dr. Bolden to continue to alternate the tylenol with the pain mediation. She may take two pain tablets tonight if pain is severe.

## 2018-06-21 NOTE — TELEPHONE ENCOUNTER
Spoke with patient, she is c/o of headaches and neck pain on left side occurs more at night, when pain comes patient states its a 9/10. Right now she says pain level is at a one. Patient is taking pain medication and alternating with Tylenol every 4-6 hours. The pain comes and goes during the day but is worse at night, patient says she was told to call if she gets headaches on left side because she had more work done on this side. Please advise.

## 2018-06-26 ENCOUNTER — OFFICE VISIT (OUTPATIENT)
Dept: OTOLARYNGOLOGY | Facility: CLINIC | Age: 58
End: 2018-06-26
Payer: COMMERCIAL

## 2018-06-26 VITALS
DIASTOLIC BLOOD PRESSURE: 82 MMHG | HEIGHT: 66 IN | SYSTOLIC BLOOD PRESSURE: 110 MMHG | BODY MASS INDEX: 23.64 KG/M2 | TEMPERATURE: 97 F | WEIGHT: 147.06 LBS | HEART RATE: 90 BPM

## 2018-06-26 DIAGNOSIS — J32.8 OTHER CHRONIC SINUSITIS: Primary | ICD-10-CM

## 2018-06-26 DIAGNOSIS — J34.2 NASAL SEPTAL DEVIATION: ICD-10-CM

## 2018-06-26 DIAGNOSIS — J34.3 HYPERTROPHY OF INFERIOR NASAL TURBINATE: ICD-10-CM

## 2018-06-26 PROCEDURE — 99024 POSTOP FOLLOW-UP VISIT: CPT | Mod: S$GLB,,, | Performed by: OTOLARYNGOLOGY

## 2018-06-26 PROCEDURE — 99999 PR PBB SHADOW E&M-EST. PATIENT-LVL III: CPT | Mod: PBBFAC,,, | Performed by: OTOLARYNGOLOGY

## 2018-06-26 PROCEDURE — 31237 NSL/SINS NDSC SURG BX POLYPC: CPT | Mod: 79,50,S$GLB, | Performed by: OTOLARYNGOLOGY

## 2018-06-26 NOTE — PROCEDURES
Procedures     Endoscopic Sinonasal Debridement    Indication: Wound debridement following surgery for chronic sinusitis    Fiberoptic nasal endoscopy was used to assist with debridement of the sinonasal cavities as part of necessary postoperative care.  Informed consent was obtained prior to proceeding.  The nasal cavity was decongested with topical 0.25% phenylephrine and anesthetized with 4% lidocaine.  A rigid 0-degree endoscope was introduced into the nasal cavity.    Findings:  Nasal cavity:  inferior turbinates and septum intact   no synechiae   Ethmoid cavities:  dense crusted debris present bilaterally   debrided with Jose forceps   widely patent following debridement   Maxillary sinus:  widely patent antrostomy bilaterally   dense crusted debris present bilaterally             The patient tolerated the procedure well.

## 2018-06-26 NOTE — PROGRESS NOTES
"  Subjective:      Verito Perez is a 57 y.o. female who comes for follow-up 1 week status-post endoscopic sinus surgery.  She states that she has been doing relatively well. She has had pressure with the nasal packing in place.  She notes some rhinorrhea but denies epistaxis.     Past medical history, surgical history, family history, social history reviewed and unchanged from last visit.         Objective:     /82 (BP Location: Right arm, Patient Position: Sitting, BP Method: Large (Automatic))   Pulse 90   Temp 97.2 °F (36.2 °C) (Oral)   Ht 5' 6" (1.676 m)   Wt 66.7 kg (147 lb 0.8 oz)   BMI 23.73 kg/m²      General:   not in distress   Nasal:  edematous mucosa   no septal hematoma   no bleeding  Bilateral nasal septal splints removed;bilateral Clemente packing removed.   Septum midline.     Oral Cavity:   clear   Oropharynx:   no bleeding   Neck:   nontender       Procedure    Endoscopic debridement performed.  See procedure note.        Data Reviewed    Pathology report indicated non-eosinophilic chronic inflammation.      Assessment:     Doing well following bilateral endoscopic sinus surgery.  She also underwent septoplasty/SMRT.    1. Other chronic sinusitis    2. Nasal septal deviation    3. Hypertrophy of inferior nasal turbinate         Plan:     Nasal saline irrigations TID.    Follow-up in about 10 days (around 7/6/2018).     Yuliya Hawkins MD  "

## 2018-07-03 ENCOUNTER — OFFICE VISIT (OUTPATIENT)
Dept: OTOLARYNGOLOGY | Facility: CLINIC | Age: 58
End: 2018-07-03
Payer: COMMERCIAL

## 2018-07-03 VITALS
HEIGHT: 66 IN | BODY MASS INDEX: 23.5 KG/M2 | SYSTOLIC BLOOD PRESSURE: 100 MMHG | DIASTOLIC BLOOD PRESSURE: 71 MMHG | WEIGHT: 146.25 LBS | TEMPERATURE: 98 F | HEART RATE: 72 BPM

## 2018-07-03 DIAGNOSIS — J32.8 OTHER CHRONIC SINUSITIS: Primary | ICD-10-CM

## 2018-07-03 PROCEDURE — 31237 NSL/SINS NDSC SURG BX POLYPC: CPT | Mod: 50,79,S$GLB, | Performed by: OTOLARYNGOLOGY

## 2018-07-03 PROCEDURE — 99213 OFFICE O/P EST LOW 20 MIN: CPT | Mod: 25,24,S$GLB, | Performed by: OTOLARYNGOLOGY

## 2018-07-03 PROCEDURE — 99999 PR PBB SHADOW E&M-EST. PATIENT-LVL III: CPT | Mod: PBBFAC,,, | Performed by: OTOLARYNGOLOGY

## 2018-07-03 PROCEDURE — 3008F BODY MASS INDEX DOCD: CPT | Mod: CPTII,S$GLB,, | Performed by: OTOLARYNGOLOGY

## 2018-07-03 NOTE — PROGRESS NOTES
"  Subjective:      Verito Perez is a 57 y.o. female who comes for follow-up 1 week status-post endoscopic sinus surgery.  She states that she has been doing relatively well. She has had pressure with the nasal packing in place.  She notes some rhinorrhea but denies epistaxis.     Past medical history, surgical history, family history, social history reviewed and unchanged from last visit.         Objective:     /71 (BP Location: Left arm, Patient Position: Sitting, BP Method: Large (Automatic))   Pulse 72   Temp 97.5 °F (36.4 °C) (Oral)   Ht 5' 6" (1.676 m)   Wt 66.3 kg (146 lb 4.4 oz)   BMI 23.61 kg/m²      General:   not in distress   Nasal:  edematous mucosa   no septal hematoma   no bleeding  Bilateral nasal septal splints removed;bilateral Clemente packing removed.   Septum midline.     Oral Cavity:   clear   Oropharynx:   no bleeding   Neck:   nontender       Procedure    Endoscopic debridement performed.  See procedure note.        Data Reviewed    Pathology report indicated non-eosinophilic chronic inflammation.      Assessment:     Doing well following bilateral endoscopic sinus surgery.  She also underwent septoplasty/SMRT.    1. Other chronic sinusitis         Plan:     Nasal saline irrigations TID.    Follow-up in about 10 days (around 7/13/2018).     Yuliya Hawkins MD  "

## 2018-07-10 ENCOUNTER — OFFICE VISIT (OUTPATIENT)
Dept: OTOLARYNGOLOGY | Facility: CLINIC | Age: 58
End: 2018-07-10
Payer: COMMERCIAL

## 2018-07-10 VITALS
HEIGHT: 66 IN | WEIGHT: 153.69 LBS | BODY MASS INDEX: 24.7 KG/M2 | DIASTOLIC BLOOD PRESSURE: 74 MMHG | HEART RATE: 86 BPM | SYSTOLIC BLOOD PRESSURE: 101 MMHG | TEMPERATURE: 97 F

## 2018-07-10 DIAGNOSIS — J32.8 OTHER CHRONIC SINUSITIS: Primary | ICD-10-CM

## 2018-07-10 PROCEDURE — 99213 OFFICE O/P EST LOW 20 MIN: CPT | Mod: 25,S$GLB,, | Performed by: OTOLARYNGOLOGY

## 2018-07-10 PROCEDURE — 99999 PR PBB SHADOW E&M-EST. PATIENT-LVL III: CPT | Mod: PBBFAC,,, | Performed by: OTOLARYNGOLOGY

## 2018-07-10 PROCEDURE — 3008F BODY MASS INDEX DOCD: CPT | Mod: CPTII,S$GLB,, | Performed by: OTOLARYNGOLOGY

## 2018-07-10 PROCEDURE — 31237 NSL/SINS NDSC SURG BX POLYPC: CPT | Mod: 79,50,S$GLB, | Performed by: OTOLARYNGOLOGY

## 2018-07-10 RX ORDER — LEVOCETIRIZINE DIHYDROCHLORIDE 5 MG/1
5 TABLET, FILM COATED ORAL NIGHTLY
Qty: 30 TABLET | Refills: 11 | Status: SHIPPED | OUTPATIENT
Start: 2018-07-10 | End: 2019-07-15 | Stop reason: SDUPTHER

## 2018-07-10 NOTE — PROGRESS NOTES
"CC: f/u sinus surgery  Subjective:      Verito Perez is a 57 y.o. female who comes for follow-up 3 weeks status-post endoscopic sinus surgery.  She states that she has been doing relatively well. She states she is no longer having nasal congestion or headaches.  She denies rhinorrhea. She reports sneezing.     Past medical history, surgical history, family history, social history reviewed and unchanged from last visit.         Objective:     /74 (BP Location: Left arm, Patient Position: Sitting, BP Method: Large (Automatic))   Pulse 86   Temp 97.3 °F (36.3 °C) (Oral)   Ht 5' 6" (1.676 m)   Wt 69.7 kg (153 lb 10.6 oz)   BMI 24.80 kg/m²      General:   not in distress   Nasal:  edematous mucosa   no septal hematoma   no bleeding  Bilateral nasal septal splints removed;bilateral Clemente packing removed.   Septum midline.     Oral Cavity:   clear   Oropharynx:   no bleeding   Neck:   nontender       Procedure    Endoscopic debridement performed.  See procedure note.        Data Reviewed    Pathology report indicated non-eosinophilic chronic inflammation.      Assessment:     Doing well following bilateral endoscopic sinus surgery.  She also underwent septoplasty/SMRT.    1. Other chronic sinusitis         Plan:     Nasal saline irrigations BID. Resume Flonase. Start Xyzal.    Follow-up in about 4 weeks (around 8/7/2018).     Yuliya Hawkins MD  "

## 2018-07-10 NOTE — PROCEDURES
Procedures       Endoscopic Sinonasal Debridement    Indication: Wound debridement following surgery for chronic sinusitis    Fiberoptic nasal endoscopy was used to assist with debridement of the sinonasal cavities as part of necessary postoperative care.  Informed consent was obtained prior to proceeding.  The nasal cavity was decongested with topical 0.25% phenylephrine and anesthetized with 4% lidocaine.  A rigid 0-degree endoscope was introduced into the nasal cavity.    Findings:  Nasal cavity:  inferior turbinates and septum intact   no synechiae   Ethmoid cavities:  minimal debris bilaterally   debrided with Jose forceps   widely patent following debridement   Maxillary sinus:  widely patent antrostomy bilaterally   minimal debris bilaterally             The patient tolerated the procedure well.

## 2018-08-01 ENCOUNTER — OFFICE VISIT (OUTPATIENT)
Dept: OTOLARYNGOLOGY | Facility: CLINIC | Age: 58
End: 2018-08-01
Payer: COMMERCIAL

## 2018-08-01 VITALS
WEIGHT: 153.69 LBS | HEART RATE: 72 BPM | DIASTOLIC BLOOD PRESSURE: 73 MMHG | SYSTOLIC BLOOD PRESSURE: 107 MMHG | HEIGHT: 66 IN | BODY MASS INDEX: 24.7 KG/M2

## 2018-08-01 DIAGNOSIS — J32.8 OTHER CHRONIC SINUSITIS: Primary | ICD-10-CM

## 2018-08-01 PROCEDURE — 99999 PR PBB SHADOW E&M-EST. PATIENT-LVL III: CPT | Mod: PBBFAC,,, | Performed by: OTOLARYNGOLOGY

## 2018-08-01 PROCEDURE — 3008F BODY MASS INDEX DOCD: CPT | Mod: CPTII,S$GLB,, | Performed by: OTOLARYNGOLOGY

## 2018-08-01 PROCEDURE — 99213 OFFICE O/P EST LOW 20 MIN: CPT | Mod: 25,24,S$GLB, | Performed by: OTOLARYNGOLOGY

## 2018-08-01 PROCEDURE — 31231 NASAL ENDOSCOPY DX: CPT | Mod: 79,S$GLB,, | Performed by: OTOLARYNGOLOGY

## 2018-08-01 RX ORDER — FLUTICASONE PROPIONATE 50 MCG
2 SPRAY, SUSPENSION (ML) NASAL DAILY
Qty: 1 BOTTLE | Refills: 12 | Status: SHIPPED | OUTPATIENT
Start: 2018-08-01

## 2018-08-01 NOTE — PROGRESS NOTES
"CC: f/u sinus surgery    Subjective:      Verito Perez is a 57 y.o. female who comes for follow-up 6 weeks status-post endoscopic sinus surgery.  She states that she has been doing great. She states she is no longer having nasal congestion or headaches.  She denies rhinorrhea. She denies epistaxis. She denies facial pain or pressure.     Past medical history, surgical history, family history, social history reviewed and unchanged from last visit.         Objective:     /73 (BP Location: Left arm, Patient Position: Sitting, BP Method: Large (Automatic))   Pulse 72   Ht 5' 6" (1.676 m)   Wt 69.7 kg (153 lb 10.6 oz)   BMI 24.80 kg/m²      Constitutional: Well appearing / communicating.  NAD.  Eyes: EOM I Bilaterally  Head/Face: Normocephalic.  Negative paranasal sinus pressure/tenderness.  Salivary glands WNL.  House Brackmann I Bilaterally.    Right Ear: External Auditory Canal WNL,TM w/o masses/lesions/perforations.  Auricle WNL.  Left Ear: External Auditory Canal WNL,TM w/o masses/lesions/perforations. Auricle WNL.  Nose: No gross nasal septal deviation. Inferior Turbinates 3+ bilaterally. No septal perforation. No masses/lesions. External nasal skin without masses/lesions.  Oral Cavity: Gingiva/lips WNL.  FOM Soft, no masses palpated. Oral Tongue mobile. Hard Palate WNL.   Oropharynx: BOT WNL. No masses/lesions noted. Tonsillar fossa/pharyngeal wall without lesions. Posterior oropharynx WNL.  Soft palate without masses. Midline uvula.   Neck/Lymphatic: No LAD I-VI bilaterally.  No thyromegaly.  No masses noted on exam.    Mirror laryngoscopy/nasopharyngoscopy: Active gag reflex.  Unable to perform.    Neuro/Psychiatric: AOx3.  Normal mood and affect.   Cardiovascular: Normal carotid pulses bilaterally, no increasing jugular venous distention noted at cervical region bilaterally.    Respiratory: Normal respiratory effort, no stridor, no retractions noted.    Procedure    Nasal endoscopy performed.  " See procedure note.        Data Reviewed    Pathology report indicated non-eosinophilic chronic inflammation.      Assessment:     Doing well following bilateral endoscopic sinus surgery.  She also underwent septoplasty/SMRT.    1. Other chronic sinusitis         Plan:     Nasal saline irrigations BID. Resume Flonase. Start Xyzal.    Follow-up in about 3 months (around 11/1/2018).     Yuliya Hawkins MD

## 2018-08-01 NOTE — PROCEDURES
Procedures     PROCEDURE NOTE:  Nasal endoscopy   Preprocedure diagnosis:  Chronic sinusitis  Postprocedure diangosis:  Same  Complications:  None  Blood Loss:  None    Procedure in detail:  After verbal consent was obtained, the patient's nasal cavity was anesthesized using topical 1%lidocaine and Neosynepherine.  A rigid 0 degree endoscope was placed in first the right, then the left nasal cavity.  The inferior and middle turbinates were examined, and found to be normal bilaterally.  The middle meatus and maxillary antrum was also examined, and found to be patent bilaterally. Bilateral ethmoid cavities patent.  No purulent drainage or masses seen.  The patient tolerated the procedure well and there were no complications.

## 2018-08-21 RX ORDER — CITALOPRAM 20 MG/1
TABLET, FILM COATED ORAL
Qty: 90 TABLET | Refills: 3 | Status: SHIPPED | OUTPATIENT
Start: 2018-08-21 | End: 2019-08-18 | Stop reason: SDUPTHER

## 2018-09-10 PROBLEM — J01.10 SUBACUTE FRONTAL SINUSITIS: Status: RESOLVED | Noted: 2018-06-05 | Resolved: 2018-09-10

## 2018-09-19 DIAGNOSIS — Z12.31 VISIT FOR SCREENING MAMMOGRAM: Primary | ICD-10-CM

## 2018-11-19 ENCOUNTER — HOSPITAL ENCOUNTER (OUTPATIENT)
Dept: RADIOLOGY | Facility: HOSPITAL | Age: 58
Discharge: HOME | End: 2018-11-19
Attending: SPECIALIST
Payer: COMMERCIAL

## 2018-11-19 DIAGNOSIS — Z12.31 VISIT FOR SCREENING MAMMOGRAM: ICD-10-CM

## 2018-11-19 PROCEDURE — 77067 SCR MAMMO BI INCL CAD: CPT | Mod: TC,PO

## 2018-11-19 PROCEDURE — 77063 BREAST TOMOSYNTHESIS BI: CPT | Mod: TC,PO

## 2019-02-21 ENCOUNTER — TELEPHONE (OUTPATIENT)
Dept: FAMILY MEDICINE | Facility: CLINIC | Age: 59
End: 2019-02-21

## 2019-02-21 DIAGNOSIS — R19.7 DIARRHEA, UNSPECIFIED TYPE: Primary | ICD-10-CM

## 2019-02-21 NOTE — TELEPHONE ENCOUNTER
Called pt back and she went to  sat and was told she has bot strand A and B flu and she has had a terrible bout of diarrhea. She takes imodium every time she goes to the bathroom and its uncontrollable. She said that she is requesting something for the diarrhea she said that the urgent care gave her augmentin or amoxicillin one she could not remember the name. But she was blowing out her nose yellow stuff now it is clear so she does not think she needs another antibiotic but she would like something for diarrhea

## 2019-02-21 NOTE — TELEPHONE ENCOUNTER
----- Message from Sai Francisco sent at 2/21/2019 12:52 PM CST -----  Contact: self/[phone]  Patient would like to be seen for a sooner appointment due to current bout of diarrhea that is getting worse.      Please call in regard to this issue.

## 2019-02-22 ENCOUNTER — PATIENT MESSAGE (OUTPATIENT)
Dept: FAMILY MEDICINE | Facility: CLINIC | Age: 59
End: 2019-02-22

## 2019-02-22 RX ORDER — METRONIDAZOLE 500 MG/1
500 TABLET ORAL EVERY 8 HOURS
Qty: 30 TABLET | Refills: 0 | Status: SHIPPED | OUTPATIENT
Start: 2019-02-22 | End: 2019-06-11 | Stop reason: ALTCHOICE

## 2019-02-22 RX ORDER — CHOLESTYRAMINE 4 G/9G
4 POWDER, FOR SUSPENSION ORAL
Qty: 60 PACKET | Refills: 1 | Status: SHIPPED | OUTPATIENT
Start: 2019-02-22 | End: 2019-06-11

## 2019-02-22 NOTE — TELEPHONE ENCOUNTER
So, she is off the antibiotics  Collect stool for C. Diff  Get OTC lactobacillus capsules  Start flagyl 500 mg tid once stool is collected

## 2019-03-11 RX ORDER — GABAPENTIN 300 MG/1
CAPSULE ORAL
Qty: 30 CAPSULE | Refills: 3 | Status: SHIPPED | OUTPATIENT
Start: 2019-03-11 | End: 2019-07-10 | Stop reason: SDUPTHER

## 2019-06-10 ENCOUNTER — TELEPHONE (OUTPATIENT)
Dept: FAMILY MEDICINE | Facility: CLINIC | Age: 59
End: 2019-06-10

## 2019-06-10 NOTE — TELEPHONE ENCOUNTER
----- Message from Kalyan See sent at 6/10/2019  8:37 AM CDT -----  Contact: self/[phone]  She has a persistent cough and is requesting an appointment for today.

## 2019-06-10 NOTE — TELEPHONE ENCOUNTER
Pt stated that she has been coughing x 2 weeks. She has been taking Mucinex DM without relief. Coughing is much worse at night.     Pharmacy: [facility]

## 2019-06-11 ENCOUNTER — OFFICE VISIT (OUTPATIENT)
Dept: FAMILY MEDICINE | Facility: CLINIC | Age: 59
End: 2019-06-11
Payer: COMMERCIAL

## 2019-06-11 VITALS
BODY MASS INDEX: 25.03 KG/M2 | TEMPERATURE: 98 F | HEIGHT: 66 IN | WEIGHT: 155.75 LBS | HEART RATE: 74 BPM | SYSTOLIC BLOOD PRESSURE: 116 MMHG | DIASTOLIC BLOOD PRESSURE: 70 MMHG | OXYGEN SATURATION: 97 %

## 2019-06-11 DIAGNOSIS — J01.90 ACUTE SINUSITIS, RECURRENCE NOT SPECIFIED, UNSPECIFIED LOCATION: Primary | ICD-10-CM

## 2019-06-11 PROCEDURE — 99213 OFFICE O/P EST LOW 20 MIN: CPT | Mod: S$GLB,,, | Performed by: FAMILY MEDICINE

## 2019-06-11 PROCEDURE — 99213 PR OFFICE/OUTPT VISIT, EST, LEVL III, 20-29 MIN: ICD-10-PCS | Mod: S$GLB,,, | Performed by: FAMILY MEDICINE

## 2019-06-11 RX ORDER — CEPHALEXIN 500 MG/1
500 CAPSULE ORAL 2 TIMES DAILY
Qty: 20 CAPSULE | Refills: 0 | Status: SHIPPED | OUTPATIENT
Start: 2019-06-11 | End: 2019-06-21

## 2019-06-11 RX ORDER — IVERMECTIN 10 MG/G
CREAM TOPICAL
COMMUNITY
Start: 2019-04-03 | End: 2023-04-03

## 2019-06-11 RX ORDER — CODEINE PHOSPHATE AND GUAIFENESIN 10; 100 MG/5ML; MG/5ML
10 SOLUTION ORAL EVERY 4 HOURS PRN
Qty: 240 ML | Refills: 0 | Status: SHIPPED | OUTPATIENT
Start: 2019-06-11 | End: 2019-06-21

## 2019-06-11 NOTE — PROGRESS NOTES
Subjective:      Patient ID: Verito Perez is a 58 y.o. female.    Chief Complaint: Cough      HPI   Still coughing, perhaps maybe a little bit better; nose running yucky green and yellow and constantly blowing for 3 weeks  Taking mucinex DM  Had sinus surgery with Dr Bolden last year  See note under surgery  Non smoker  Not asthmatic   not smoker  Mother was a smoker  First visit in over a year  No headache or pressure like she used to get before sinus surgery   was sick    Review of Systems   Constitutional: Negative.    HENT: Positive for rhinorrhea.    Respiratory: Positive for cough.    Cardiovascular: Negative.    Gastrointestinal: Negative.    Endocrine: Negative.    Genitourinary: Negative.    Musculoskeletal: Negative.    Psychiatric/Behavioral: Negative.    All other systems reviewed and are negative.    Objective:     Physical Exam   Constitutional: She is oriented to person, place, and time. She appears well-developed and well-nourished.   HENT:   Head: Normocephalic.   Eyes: Pupils are equal, round, and reactive to light. Conjunctivae and EOM are normal.   Neck: Normal range of motion. Neck supple.   Cardiovascular: Normal rate, regular rhythm and normal heart sounds.   Pulmonary/Chest: Effort normal and breath sounds normal.   Musculoskeletal: Normal range of motion.   Neurological: She is alert and oriented to person, place, and time. She has normal reflexes.   Skin: Skin is warm and dry.   Psychiatric: She has a normal mood and affect. Her behavior is normal. Judgment and thought content normal.   Nursing note and vitals reviewed.    Assessment:     1. Acute sinusitis, recurrence not specified, unspecified location      Plan:        Medication List           Accurate as of 6/11/19  3:39 PM. If you have any questions, ask your nurse or doctor.               START taking these medications    cephALEXin 500 MG capsule  Commonly known as:  KEFLEX  Take 1 capsule (500 mg total) by mouth  2 (two) times daily. for 10 days  Started by:  Sammy Partida MD     guaifenesin-codeine 100-10 mg/5 ml  mg/5 mL syrup  Commonly known as:  TUSSI-ORGANIDIN NR  Take 10 mLs by mouth every 4 (four) hours as needed for Cough.  Started by:  Sammy Partida MD        CONTINUE taking these medications    citalopram 20 MG tablet  Commonly known as:  CELEXA  TAKE 1 TABLET BY MOUTH EVERY DAY     fluticasone propionate 50 mcg/actuation nasal spray  Commonly known as:  FLONASE  2 sprays (100 mcg total) by Each Nare route once daily.     gabapentin 300 MG capsule  Commonly known as:  NEURONTIN  TAKE 1 CAPSULE BY MOUTH EVERY DAY AT BEDTIME     ibuprofen 200 MG tablet  Commonly known as:  ADVIL,MOTRIN     Lactobacillus acidophilus 1 billion cell Cap  Take 1 capsule by mouth 3 (three) times daily.     levocetirizine 5 MG tablet  Commonly known as:  XYZAL  Take 1 tablet (5 mg total) by mouth every evening.     PREMARIN 0.625 MG tablet  Generic drug:  estrogens (conjugated)     SOOLANTRA 1 % Crea  Generic drug:  ivermectin        STOP taking these medications    butalbital-acetaminophen-caffeine -40 mg -40 mg per tablet  Commonly known as:  FIORICET, ESGIC  Stopped by:  Sammy Partida MD     cholestyramine 4 gram packet  Commonly known as:  QUESTRAN  Stopped by:  Sammy Partida MD     metroNIDAZOLE 500 MG tablet  Commonly known as:  FLAGYL  Stopped by:  Sammy Partida MD           Where to Get Your Medications      These medications were sent to [id number] [place], [place]  [address] [place] [id number]    Phone:  [phone]   · cephALEXin 500 MG capsule  · guaifenesin-codeine 100-10 mg/5 ml  mg/5 mL syrup       Acute sinusitis, recurrence not specified, unspecified location    Other orders  -     cephALEXin (KEFLEX) 500 MG capsule; Take 1 capsule (500 mg total) by mouth 2 (two) times daily. for 10 days  Dispense: 20 capsule; Refill: 0  -      guaifenesin-codeine 100-10 mg/5 ml (TUSSI-ORGANIDIN NR)  mg/5 mL syrup; Take 10 mLs by mouth every 4 (four) hours as needed for Cough.  Dispense: 240 mL; Refill: 0    irrigate, keflex, richard AC

## 2019-07-10 RX ORDER — GABAPENTIN 300 MG/1
CAPSULE ORAL
Qty: 30 CAPSULE | Refills: 3 | Status: SHIPPED | OUTPATIENT
Start: 2019-07-10 | End: 2019-11-11 | Stop reason: SDUPTHER

## 2019-07-15 RX ORDER — LEVOCETIRIZINE DIHYDROCHLORIDE 5 MG/1
5 TABLET, FILM COATED ORAL NIGHTLY
Qty: 30 TABLET | Refills: 11 | Status: SHIPPED | OUTPATIENT
Start: 2019-07-15 | End: 2023-06-08

## 2019-07-18 ENCOUNTER — TELEPHONE (OUTPATIENT)
Dept: OTOLARYNGOLOGY | Facility: CLINIC | Age: 59
End: 2019-07-18

## 2019-07-18 NOTE — TELEPHONE ENCOUNTER
----- Message from Kalyan See sent at 7/18/2019  1:44 PM CDT -----  Contact: self/[phone]  She is returning Harmony's call.

## 2019-07-18 NOTE — TELEPHONE ENCOUNTER
----- Message from Sai Francisco sent at 7/18/2019 12:20 PM CDT -----  Contact: patient  Patient called to speak with your office in regard to a prescription refill she called in last week that no one responded to.    Please call [phone] to discuss today.

## 2019-08-19 RX ORDER — CITALOPRAM 20 MG/1
TABLET, FILM COATED ORAL
Qty: 90 TABLET | Refills: 3 | Status: SHIPPED | OUTPATIENT
Start: 2019-08-19 | End: 2020-08-13

## 2019-09-25 DIAGNOSIS — Z12.39 SCREENING BREAST EXAMINATION: Primary | ICD-10-CM

## 2019-11-11 RX ORDER — GABAPENTIN 300 MG/1
CAPSULE ORAL
Qty: 30 CAPSULE | Refills: 0 | Status: SHIPPED | OUTPATIENT
Start: 2019-11-11 | End: 2019-12-12 | Stop reason: SDUPTHER

## 2019-11-25 ENCOUNTER — HOSPITAL ENCOUNTER (OUTPATIENT)
Dept: RADIOLOGY | Facility: HOSPITAL | Age: 59
Discharge: HOME | End: 2019-11-25
Attending: SPECIALIST
Payer: COMMERCIAL

## 2019-11-25 DIAGNOSIS — Z12.39 SCREENING BREAST EXAMINATION: ICD-10-CM

## 2019-11-25 PROCEDURE — 77067 SCR MAMMO BI INCL CAD: CPT | Mod: TC,PO

## 2019-12-12 RX ORDER — GABAPENTIN 300 MG/1
CAPSULE ORAL
Qty: 30 CAPSULE | Refills: 0 | Status: SHIPPED | OUTPATIENT
Start: 2019-12-12 | End: 2020-01-10

## 2020-01-10 RX ORDER — GABAPENTIN 300 MG/1
CAPSULE ORAL
Qty: 90 CAPSULE | Refills: 3 | Status: SHIPPED | OUTPATIENT
Start: 2020-01-10 | End: 2021-01-12

## 2020-10-05 ENCOUNTER — PATIENT MESSAGE (OUTPATIENT)
Dept: INTERNAL MEDICINE | Facility: CLINIC | Age: 60
End: 2020-10-05

## 2020-11-27 ENCOUNTER — HOSPITAL ENCOUNTER (OUTPATIENT)
Dept: RADIOLOGY | Facility: HOSPITAL | Age: 60
Discharge: HOME | End: 2020-11-27
Attending: SPECIALIST
Payer: COMMERCIAL

## 2020-11-27 DIAGNOSIS — Z12.31 SCREENING MAMMOGRAM, ENCOUNTER FOR: ICD-10-CM

## 2020-11-27 PROCEDURE — 77067 SCR MAMMO BI INCL CAD: CPT | Mod: TC,PO

## 2021-01-05 ENCOUNTER — PATIENT MESSAGE (OUTPATIENT)
Dept: ADMINISTRATIVE | Facility: HOSPITAL | Age: 61
End: 2021-01-05

## 2021-04-05 ENCOUNTER — PATIENT MESSAGE (OUTPATIENT)
Dept: ADMINISTRATIVE | Facility: HOSPITAL | Age: 61
End: 2021-04-05

## 2021-07-06 ENCOUNTER — PATIENT MESSAGE (OUTPATIENT)
Dept: ADMINISTRATIVE | Facility: HOSPITAL | Age: 61
End: 2021-07-06

## 2021-08-02 DIAGNOSIS — N64.4 MASTODYNIA: Primary | ICD-10-CM

## 2021-08-06 ENCOUNTER — HOSPITAL ENCOUNTER (OUTPATIENT)
Dept: RADIOLOGY | Facility: HOSPITAL | Age: 61
Discharge: HOME | End: 2021-08-06
Attending: SPECIALIST
Payer: COMMERCIAL

## 2021-08-06 DIAGNOSIS — N64.4 MASTODYNIA: ICD-10-CM

## 2021-08-06 PROCEDURE — 77062 BREAST TOMOSYNTHESIS BI: CPT | Mod: TC,PO

## 2021-10-04 ENCOUNTER — PATIENT MESSAGE (OUTPATIENT)
Dept: FAMILY MEDICINE | Facility: CLINIC | Age: 61
End: 2021-10-04

## 2021-12-22 RX ORDER — GABAPENTIN 300 MG/1
CAPSULE ORAL
Qty: 90 CAPSULE | Refills: 3 | Status: SHIPPED | OUTPATIENT
Start: 2021-12-22 | End: 2022-12-15

## 2022-01-10 ENCOUNTER — PATIENT MESSAGE (OUTPATIENT)
Dept: ADMINISTRATIVE | Facility: HOSPITAL | Age: 62
End: 2022-01-10
Payer: COMMERCIAL

## 2022-05-31 ENCOUNTER — PATIENT MESSAGE (OUTPATIENT)
Dept: ADMINISTRATIVE | Facility: HOSPITAL | Age: 62
End: 2022-05-31
Payer: COMMERCIAL

## 2022-12-13 NOTE — TELEPHONE ENCOUNTER
Care Due:                  Date            Visit Type   Department     Provider  --------------------------------------------------------------------------------    Last Visit: None Found      None         None Found  Next Visit: None Scheduled  None         None Found                                                            Last  Test          Frequency    Reason                     Performed    Due Date  --------------------------------------------------------------------------------    Office Visit  12 months..  citalopram...............  Not Found    Overdue    [employer] Embedded Care Gaps. Reference number: [id number]. 12/13/2022   7:25:52 AM CST

## 2022-12-15 RX ORDER — GABAPENTIN 300 MG/1
CAPSULE ORAL
Qty: 90 CAPSULE | Refills: 3 | Status: SHIPPED | OUTPATIENT
Start: 2022-12-15 | End: 2023-12-01

## 2023-01-05 DIAGNOSIS — Z12.31 SCREENING MAMMOGRAM, ENCOUNTER FOR: Primary | ICD-10-CM

## 2023-01-31 ENCOUNTER — HOSPITAL ENCOUNTER (OUTPATIENT)
Dept: RADIOLOGY | Facility: HOSPITAL | Age: 63
Discharge: HOME | End: 2023-01-31
Attending: SPECIALIST
Payer: COMMERCIAL

## 2023-01-31 DIAGNOSIS — Z12.31 SCREENING MAMMOGRAM, ENCOUNTER FOR: ICD-10-CM

## 2023-01-31 PROCEDURE — 77063 BREAST TOMOSYNTHESIS BI: CPT | Mod: 26,,, | Performed by: RADIOLOGY

## 2023-01-31 PROCEDURE — 77067 PR MAMMO, CAD, SCREENING, BILAT: ICD-10-PCS | Mod: 26,,, | Performed by: RADIOLOGY

## 2023-01-31 PROCEDURE — 77063 MAMMO DIGITAL SCREENING BILAT WITH TOMO: ICD-10-PCS | Mod: 26,,, | Performed by: RADIOLOGY

## 2023-01-31 PROCEDURE — 77067 SCR MAMMO BI INCL CAD: CPT | Mod: 26,,, | Performed by: RADIOLOGY

## 2023-01-31 PROCEDURE — 77067 SCR MAMMO BI INCL CAD: CPT | Mod: TC,PO

## 2023-03-16 ENCOUNTER — TELEPHONE (OUTPATIENT)
Dept: GASTROENTEROLOGY | Facility: CLINIC | Age: 63
End: 2023-03-16
Payer: COMMERCIAL

## 2023-03-16 NOTE — TELEPHONE ENCOUNTER
----- Message from Gabby Tesfaye sent at 3/16/2023  2:23 PM CDT -----  Type:  Needs Medical Advice    Who Called:  pt  Symptoms (please be specific):  diarrhea and constipation   How long has patient had these symptoms:  3 months     Would the patient rather a call back or a response via [facility]?  Call   Best Call Back Number:  [phone]  Additional Information:

## 2023-04-03 ENCOUNTER — OFFICE VISIT (OUTPATIENT)
Dept: GASTROENTEROLOGY | Facility: CLINIC | Age: 63
End: 2023-04-03
Payer: COMMERCIAL

## 2023-04-03 VITALS — HEIGHT: 66 IN | WEIGHT: 158.94 LBS | BODY MASS INDEX: 25.54 KG/M2

## 2023-04-03 DIAGNOSIS — Z12.11 SCREENING FOR COLON CANCER: ICD-10-CM

## 2023-04-03 DIAGNOSIS — K58.2 IRRITABLE BOWEL SYNDROME WITH ALTERNATING BOWEL HABITS: Primary | ICD-10-CM

## 2023-04-03 PROCEDURE — 99204 OFFICE O/P NEW MOD 45 MIN: CPT | Mod: S$GLB,,, | Performed by: NURSE PRACTITIONER

## 2023-04-03 PROCEDURE — 99999 PR PBB SHADOW E&M-EST. PATIENT-LVL III: ICD-10-PCS | Mod: PBBFAC,,, | Performed by: NURSE PRACTITIONER

## 2023-04-03 PROCEDURE — 1159F MED LIST DOCD IN RCRD: CPT | Mod: CPTII,S$GLB,, | Performed by: NURSE PRACTITIONER

## 2023-04-03 PROCEDURE — 3008F PR BODY MASS INDEX (BMI) DOCUMENTED: ICD-10-PCS | Mod: CPTII,S$GLB,, | Performed by: NURSE PRACTITIONER

## 2023-04-03 PROCEDURE — 3008F BODY MASS INDEX DOCD: CPT | Mod: CPTII,S$GLB,, | Performed by: NURSE PRACTITIONER

## 2023-04-03 PROCEDURE — 99204 PR OFFICE/OUTPT VISIT, NEW, LEVL IV, 45-59 MIN: ICD-10-PCS | Mod: S$GLB,,, | Performed by: NURSE PRACTITIONER

## 2023-04-03 PROCEDURE — 1159F PR MEDICATION LIST DOCUMENTED IN MEDICAL RECORD: ICD-10-PCS | Mod: CPTII,S$GLB,, | Performed by: NURSE PRACTITIONER

## 2023-04-03 PROCEDURE — 99999 PR PBB SHADOW E&M-EST. PATIENT-LVL III: CPT | Mod: PBBFAC,,, | Performed by: NURSE PRACTITIONER

## 2023-04-03 NOTE — PATIENT INSTRUCTIONS
Start Benefiber once a day.   Take Miralax once a day for 2-3 days to reset the colon and help move stool through large intestine. Once bowel movements become more regular, stop Miralax and continue Benefiber.     Colonoscopy in June. We will call to schedule when the schedule opens.

## 2023-04-03 NOTE — Clinical Note
Can you please put a reminder to call to schedule screening colonoscopy when June schedule opens? Thanks.

## 2023-04-03 NOTE — PROGRESS NOTES
"     GASTROENTEROLOGY CLINIC NOTE    Chief Complaint: The primary encounter diagnosis was Irritable bowel syndrome with alternating bowel habits. A diagnosis of Screening for colon cancer was also pertinent to this visit.  Referring provider/PCP: Sammy Partida MD    Verito Perez is a 62 y.o. female who is a new patient to me with a PMH that's significant for anxiety and gluten intolerance. She is here today to establish care for alternating bowel habits and colon cancer screening.   Has h/o "stomach problems" most of her life. Cut out gluten about 10 years ago and helped but symptoms have been worsening over last year.  Continues to be gluten free.   Alternating between constipation and loose bowel movements. Won't go for few days and then will have bowel movements all day. Mushy consistency, incomplete emptying, some urgency.  Some straining with initial bowel movement.  Has bowel movements 2 days a week but on those days 3-4 small frequent bowel movements.   Occasionally will have incontinence  No nocturnal symptoms or unexplained weight loss.  No abdominal pain   No blood or melena  Currently not taking fiber supplement but has tried Metamucil in the past. Caused a lot of gas.   Eating yogurt with probiotic      Artifical Sweeteners: Diet Coke  NSAIDs: occasionally  ETOH: occasionally  Caffeine: Diet Coke  Cholecystectomy: No  Anticoagulation or Antiplatelet: No    History of H.pylori: No  H.pylori Treatment:    Prior Upper Endoscopy: 6/13/2013 with Dr. Chahal  Normal duodenum  Erythema in antrum  Hiatal Hernia  No Path Report Available    Prior Colonoscopy: 4/2013 with Dr. Chahal  10 year recall recommended    Family h/o Colon Cancer: No  Family h/o Crohn's Disease or Ulcerative Colitis: No  Family h/o Celiac Sprue: No  Abdominal Surgeries: No    Review of Systems   Constitutional:  Negative for weight loss.   HENT:  Negative for sore throat.    Eyes:  Negative for blurred vision. "   Respiratory:  Negative for cough.    Cardiovascular:  Negative for chest pain.   Gastrointestinal:  Positive for constipation and diarrhea. Negative for abdominal pain, blood in stool, heartburn, melena, nausea and vomiting.   Genitourinary:  Negative for dysuria.   Musculoskeletal:  Negative for myalgias.   Skin:  Negative for rash.   Neurological:  Negative for headaches.   Endo/Heme/Allergies:  Negative for environmental allergies.   Psychiatric/Behavioral:  Negative for suicidal ideas. The patient is not nervous/anxious.      Past Medical History: has a past medical history of Acne, Celiac disease, and Rosacea.    Past Surgical History: has a past surgical history that includes Hysterectomy; Tonsillectomy; Colonoscopy (4/4/13); Functional endoscopic sinus surgery (FESS) using computer-assisted navigation (N/A, 6/18/2018); Nasal septoplasty (N/A, 6/18/2018); and Cautery of turbinates (N/A, 6/18/2018).    Family History:family history includes Cancer in her maternal grandmother.    Allergies:   Review of patient's allergies indicates:   Allergen Reactions    Sulfa (sulfonamide antibiotics)        Social History: reports that she has never smoked. She has never used smokeless tobacco. She reports current alcohol use.    Home medications:   Current Outpatient Medications on File Prior to Visit   Medication Sig Dispense Refill    citalopram (CELEXA) 20 MG tablet TAKE 1 TABLET BY MOUTH EVERY DAY 90 tablet 3    fluticasone (FLONASE) 50 mcg/actuation nasal spray 2 sprays (100 mcg total) by Each Nare route once daily. 1 Bottle 12    gabapentin (NEURONTIN) 300 MG capsule TAKE 1 CAPSULE BY MOUTH EVERY DAY AT BEDTIME 90 capsule 3    ibuprofen (ADVIL,MOTRIN) 200 MG tablet Take 200 mg by mouth every 6 (six) hours as needed for Pain.      Lactobacillus acidophilus 1 billion cell Cap Take 1 capsule by mouth 3 (three) times daily. 50 capsule 2    levocetirizine (XYZAL) 5 MG tablet Take 1 tablet (5 mg total) by mouth every  "evening. 30 tablet 11    PREMARIN 0.625 mg tablet Take 0.625 mg by mouth once daily.       SOOLANTRA 1 % Crea        No current facility-administered medications on file prior to visit.       Vital signs:  Ht 5' 6" (1.676 m)   Wt 72.1 kg (158 lb 15.2 oz)   BMI 25.66 kg/m²     Physical Exam  Vitals reviewed.   Constitutional:       General: She is not in acute distress.     Appearance: Normal appearance. She is not ill-appearing.   HENT:      Head: Normocephalic.   Cardiovascular:      Rate and Rhythm: Normal rate and regular rhythm.      Heart sounds: Normal heart sounds. No murmur heard.  Pulmonary:      Effort: Pulmonary effort is normal. No respiratory distress.      Breath sounds: Normal breath sounds.   Chest:      Chest wall: No tenderness.   Abdominal:      General: Bowel sounds are normal. There is no distension.      Palpations: Abdomen is soft.      Tenderness: There is no abdominal tenderness. Negative signs include Ambrose's sign.      Hernia: No hernia is present.   Skin:     General: Skin is warm.   Neurological:      Mental Status: She is alert and oriented to person, place, and time.   Psychiatric:         Mood and Affect: Mood normal.         Behavior: Behavior normal.       Routine labs:  Lab Results   Component Value Date    WBC 7.3 03/26/2018    HGB 15.0 03/26/2018    HCT 46.3 (H) 03/26/2018    MCV 85.9 03/26/2018     03/26/2018     No results found for: INR  No results found for: IRON, FERRITIN, TIBC, FESATURATED  Lab Results   Component Value Date     06/05/2018    K 4.4 06/05/2018     06/05/2018    CO2 28 06/05/2018    BUN 19 06/05/2018    CREATININE 0.9 06/05/2018     Lab Results   Component Value Date    ALBUMIN 3.9 03/26/2018    ALT 14 03/26/2018    AST 14 03/26/2018    ALKPHOS 76 03/26/2018    BILITOT 0.3 03/26/2018     No results found for: GLUCOSE  Lab Results   Component Value Date    TSH 2.72 03/26/2018     Lab Results   Component Value Date    CALCIUM 9.6 " 06/05/2018       Imaging:      I have reviewed prior labs, imaging, and notes.      Assessment:  1. Irritable bowel syndrome with alternating bowel habits    2. Screening for colon cancer      Alternating bowel movement. Will have two bowel movements per week; on those days several, small, mushy bowel movement. Suspect overflow d/t constipation. Does have previous h/o constipation. Currently gluten free; finds this helps. Negative for celiac in past. Due for screening colonoscopy. 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     Plan:  Orders Placed This Encounter    Case Request Endoscopy: COLONOSCOPY     Colonoscopy for colon cancer screening.   Start Benefiber once a day.   Take Miralax once a day for 2-3 days to reset the colon and help move stool through large intestine. Once bowel movements become more regular, stop Miralax and continue Benefiber.     Plan of care discussed with patient who is in agreement and verbalized understanding.     I have explained the planned procedures to the patient.The risks, benefits and alternatives of the procedure were also explained in detail. Patient verbalized understanding, all questions were answered. The patient agrees to proceed as planned    Follow Up: As Needed          Gisselle Alvares, GER,FNP-BC  [facility]    (Portions of this note were dictated using voice recognition software and may contain dictation related errors in spelling/grammar/syntax not found on text review)

## 2023-05-17 RX ORDER — SODIUM, POTASSIUM,MAG SULFATES 17.5-3.13G
1 SOLUTION, RECONSTITUTED, ORAL ORAL DAILY
Qty: 1 KIT | Refills: 0 | Status: SHIPPED | OUTPATIENT
Start: 2023-05-17 | End: 2023-05-19

## 2023-06-06 ENCOUNTER — TELEPHONE (OUTPATIENT)
Dept: ENDOSCOPY | Facility: HOSPITAL | Age: 63
End: 2023-06-06
Payer: COMMERCIAL

## 2023-06-06 NOTE — TELEPHONE ENCOUNTER
Spoke with patient about arrival time @. 1130      Prep instructions reviewed: the day before the procedure, follow a clear liquid diet all day, then start the first 1/2 of prep at 5pm and take 2nd 1/2 of prep 0530  Pt must be completely NPO when prep completed               Medications: Do not take Insulin or oral diabetic medications the day of the procedure.  Take as prescribed: heart, seizure and blood pressure medication in the morning with a sip of water (less than an ounce).  Take any breathing medications and bring inhalers to hospital with you Leave all valuables and jewelry at home.     Wear comfortable clothes to procedure to change into hospital gown You cannot drive for 24 hours after your procedure because you will receive sedation for your procedure to make you comfortable.  A ride must be provided at discharge.

## 2023-06-08 ENCOUNTER — HOSPITAL ENCOUNTER (OUTPATIENT)
Facility: HOSPITAL | Age: 63
Discharge: HOME | End: 2023-06-08
Attending: INTERNAL MEDICINE | Admitting: INTERNAL MEDICINE
Payer: COMMERCIAL

## 2023-06-08 ENCOUNTER — ANESTHESIA (OUTPATIENT)
Dept: ENDOSCOPY | Facility: HOSPITAL | Age: 63
End: 2023-06-08
Payer: COMMERCIAL

## 2023-06-08 ENCOUNTER — ANESTHESIA EVENT (OUTPATIENT)
Dept: ENDOSCOPY | Facility: HOSPITAL | Age: 63
End: 2023-06-08
Payer: COMMERCIAL

## 2023-06-08 VITALS
HEIGHT: 66 IN | BODY MASS INDEX: 22.5 KG/M2 | TEMPERATURE: 98 F | OXYGEN SATURATION: 100 % | HEART RATE: 79 BPM | WEIGHT: 140 LBS | SYSTOLIC BLOOD PRESSURE: 126 MMHG | RESPIRATION RATE: 20 BRPM | DIASTOLIC BLOOD PRESSURE: 71 MMHG

## 2023-06-08 DIAGNOSIS — Z12.11 COLON CANCER SCREENING: ICD-10-CM

## 2023-06-08 PROCEDURE — 88305 TISSUE EXAM BY PATHOLOGIST: CPT | Mod: 26,,, | Performed by: PATHOLOGY

## 2023-06-08 PROCEDURE — D9220A PRA ANESTHESIA: Mod: PT,CRNA,, | Performed by: NURSE ANESTHETIST, CERTIFIED REGISTERED

## 2023-06-08 PROCEDURE — D9220A PRA ANESTHESIA: ICD-10-PCS | Mod: PT,ANES,, | Performed by: ANESTHESIOLOGY

## 2023-06-08 PROCEDURE — 45385 COLONOSCOPY W/LESION REMOVAL: CPT | Mod: PT,,, | Performed by: INTERNAL MEDICINE

## 2023-06-08 PROCEDURE — 27201012 HC FORCEPS, HOT/COLD, DISP: Performed by: INTERNAL MEDICINE

## 2023-06-08 PROCEDURE — 45385 PR COLONOSCOPY,REMV LESN,SNARE: ICD-10-PCS | Mod: PT,,, | Performed by: INTERNAL MEDICINE

## 2023-06-08 PROCEDURE — 63600175 PHARM REV CODE 636 W HCPCS: Performed by: NURSE ANESTHETIST, CERTIFIED REGISTERED

## 2023-06-08 PROCEDURE — 25000003 PHARM REV CODE 250: Performed by: INTERNAL MEDICINE

## 2023-06-08 PROCEDURE — 25000003 PHARM REV CODE 250: Performed by: NURSE ANESTHETIST, CERTIFIED REGISTERED

## 2023-06-08 PROCEDURE — 88305 TISSUE EXAM BY PATHOLOGIST: ICD-10-PCS | Mod: 26,,, | Performed by: PATHOLOGY

## 2023-06-08 PROCEDURE — D9220A PRA ANESTHESIA: ICD-10-PCS | Mod: PT,CRNA,, | Performed by: NURSE ANESTHETIST, CERTIFIED REGISTERED

## 2023-06-08 PROCEDURE — 37000008 HC ANESTHESIA 1ST 15 MINUTES: Performed by: INTERNAL MEDICINE

## 2023-06-08 PROCEDURE — D9220A PRA ANESTHESIA: Mod: PT,ANES,, | Performed by: ANESTHESIOLOGY

## 2023-06-08 PROCEDURE — 37000009 HC ANESTHESIA EA ADD 15 MINS: Performed by: INTERNAL MEDICINE

## 2023-06-08 PROCEDURE — 45385 COLONOSCOPY W/LESION REMOVAL: CPT | Mod: PT | Performed by: INTERNAL MEDICINE

## 2023-06-08 PROCEDURE — 27201089 HC SNARE, DISP (ANY): Performed by: INTERNAL MEDICINE

## 2023-06-08 PROCEDURE — 88305 TISSUE EXAM BY PATHOLOGIST: CPT | Performed by: PATHOLOGY

## 2023-06-08 RX ORDER — LIDOCAINE HYDROCHLORIDE 20 MG/ML
INJECTION INTRAVENOUS
Status: DISCONTINUED | OUTPATIENT
Start: 2023-06-08 | End: 2023-06-08

## 2023-06-08 RX ORDER — PROPOFOL 10 MG/ML
VIAL (ML) INTRAVENOUS CONTINUOUS PRN
Status: DISCONTINUED | OUTPATIENT
Start: 2023-06-08 | End: 2023-06-08

## 2023-06-08 RX ORDER — SODIUM CHLORIDE 9 MG/ML
INJECTION, SOLUTION INTRAVENOUS CONTINUOUS
Status: DISCONTINUED | OUTPATIENT
Start: 2023-06-08 | End: 2023-06-08 | Stop reason: HOSPADM

## 2023-06-08 RX ORDER — PROPOFOL 10 MG/ML
VIAL (ML) INTRAVENOUS
Status: DISCONTINUED | OUTPATIENT
Start: 2023-06-08 | End: 2023-06-08

## 2023-06-08 RX ADMIN — PROPOFOL 150 MCG/KG/MIN: 10 INJECTION, EMULSION INTRAVENOUS at 12:06

## 2023-06-08 RX ADMIN — LIDOCAINE HYDROCHLORIDE 100 MG: 20 INJECTION, SOLUTION INTRAVENOUS at 12:06

## 2023-06-08 RX ADMIN — SODIUM CHLORIDE: 0.9 INJECTION, SOLUTION INTRAVENOUS at 12:06

## 2023-06-08 RX ADMIN — PROPOFOL 100 MG: 10 INJECTION, EMULSION INTRAVENOUS at 12:06

## 2023-06-08 NOTE — H&P
Short Stay Endoscopy History and Physical    PCP - Sammy Partida MD    Procedure - Colonoscopy  ASA - II  Mallampati - per anesthesia  History of Anesthesia problems - no  Family history Anesthesia problems - no     HPI:  This is a 62 y.o. female here for evaluation of : Colon cancer screening    Family history of colon cancer - no  History of polyps - na  Change in bowel habits - no  Rectal bleeding - no      ROS:  Constitutional: No fevers, chills, No weight loss  ENT: No allergies  CV: No chest pain  Pulm: No shortness of breath  GI: see HPI  Derm: No rash    Medical History:  has a past medical history of Acne, Celiac disease, and Rosacea.    Surgical History:  has a past surgical history that includes Hysterectomy; Tonsillectomy; Colonoscopy (4/4/13); Functional endoscopic sinus surgery (FESS) using computer-assisted navigation (N/A, 6/18/2018); Nasal septoplasty (N/A, 6/18/2018); and Cautery of turbinates (N/A, 6/18/2018).    Family History: family history includes Cancer in her maternal grandmother.. Otherwise no colon cancer, inflammatory bowel disease, or GI malignancies.    Social History:  reports that she has never smoked. She has never used smokeless tobacco. She reports current alcohol use. She reports that she does not use drugs.    Review of patient's allergies indicates:   Allergen Reactions    Sulfa (sulfonamide antibiotics)        Medications:   Medications Prior to Admission   Medication Sig Dispense Refill Last Dose    citalopram (CELEXA) 20 MG tablet TAKE 1 TABLET BY MOUTH EVERY DAY 90 tablet 3 Past Week    fluticasone (FLONASE) 50 mcg/actuation nasal spray 2 sprays (100 mcg total) by Each Nare route once daily. 1 Bottle 12 6/7/2023    gabapentin (NEURONTIN) 300 MG capsule TAKE 1 CAPSULE BY MOUTH EVERY DAY AT BEDTIME 90 capsule 3 Past Week    levocetirizine (XYZAL) 5 MG tablet Take 1 tablet (5 mg total) by mouth every evening. 30 tablet 11 Past Week         Objective Findings:    Vital Signs:  see nursing notes  Physical Exam:  General Appearance: Well appearing in no acute distress  Eyes:    No scleral icterus  ENT: Neck supple  Lungs: CTA anteriorly  Heart:  S1, S2 normal, no murmurs heard  Abdomen: Soft, non tender, non distended with positive bowel sounds. No hepatosplenomegaly, ascites, or mass  Extremities: no edema  Skin: No rash      Labs:  Lab Results   Component Value Date    WBC 7.3 03/26/2018    HGB 15.0 03/26/2018    HCT 46.3 (H) 03/26/2018     03/26/2018    CHOL 153 03/26/2018    TRIG 83 03/26/2018    HDL 67 03/26/2018    ALT 14 03/26/2018    AST 14 03/26/2018     06/05/2018    K 4.4 06/05/2018     06/05/2018    CREATININE 0.9 06/05/2018    BUN 19 06/05/2018    CO2 28 06/05/2018    TSH 2.72 03/26/2018       I have explained the risks and benefits of endoscopy procedures to the patient including but not limited to bleeding, perforation, infection, and death.    Fox Marquez MD

## 2023-06-08 NOTE — ANESTHESIA POSTPROCEDURE EVALUATION
Anesthesia Post Evaluation    Patient: Verito Perez    Procedure(s) Performed: Procedure(s) (LRB):  COLONOSCOPY (N/A)    Final Anesthesia Type: general      Patient location during evaluation: PACU  Patient participation: Yes- Able to Participate  Level of consciousness: awake and alert  Post-procedure vital signs: reviewed and stable  Pain management: adequate  Airway patency: patent    PONV status at discharge: No PONV  Anesthetic complications: no      Cardiovascular status: stable  Respiratory status: spontaneous ventilation  Hydration status: euvolemic  Follow-up not needed.          Vitals Value Taken Time   /71 06/08/23 1351   Temp 36.7 °C (98 °F) 06/08/23 1327   Pulse 79 06/08/23 1351   Resp 20 06/08/23 1351   SpO2 100 % 06/08/23 1351         Event Time   Out of Recovery 13:52:44         Pain/Jarrod Score: Jarrod Score: 10 (6/8/2023  1:51 PM)

## 2023-06-08 NOTE — TRANSFER OF CARE
"Anesthesia Transfer of Care Note    Patient: Verito Perez    Procedure(s) Performed: Procedure(s) (LRB):  COLONOSCOPY (N/A)    Patient location: GI    Anesthesia Type: general and other: gen natural airway    Transport from OR: Transported from OR on room air with adequate spontaneous ventilation    Post pain: adequate analgesia    Post assessment: no apparent anesthetic complications and tolerated procedure well    Post vital signs: stable    Level of consciousness: awake, alert and oriented    Nausea/Vomiting: no nausea/vomiting    Complications: none    Transfer of care protocol was followed      Last vitals:   Visit Vitals  /78   Pulse 85   Temp 36.7 °C (98.1 °F) (Skin)   Resp 18   Ht 5' 6" (1.676 m)   Wt 63.5 kg (140 lb)   SpO2 99%   Breastfeeding No   BMI 22.60 kg/m²     "

## 2023-06-08 NOTE — ANESTHESIA PREPROCEDURE EVALUATION
06/08/2023  Verito Perez is a 62 y.o., female.      Pre-op Assessment    I have reviewed the Patient Summary Reports.     I have reviewed the Nursing Notes.    I have reviewed the Medications.     Review of Systems  Anesthesia Hx:  Denies Family Hx of Anesthesia complications.   Denies Personal Hx of Anesthesia complications.        Patient Active Problem List   Diagnosis    Rosacea    Anxiety    Other chronic sinusitis       Past Medical History:   Diagnosis Date    Acne     Celiac disease     Rosacea        ECHO: No results found for this or any previous visit.      Body mass index is 22.6 kg/m².    Tobacco Use: Low Risk     Smoking Tobacco Use: Never    Smokeless Tobacco Use: Never    Passive Exposure: Not on file       Social History     Substance and Sexual Activity   Drug Use Never        Alcohol Use: Not on file       Review of patient's allergies indicates:   Allergen Reactions    Sulfa (sulfonamide antibiotics)          Airway:  No value filed.     Physical Exam    Airway:  Mouth Opening: Normal  TM Distance: Normal          Anesthesia Plan  Type of Anesthesia, risks & benefits discussed:    Anesthesia Type: Gen Natural Airway  Intra-op Monitoring Plan: Standard ASA Monitors  Post Op Pain Control Plan: multimodal analgesia  Induction:  IV  Informed Consent: Informed consent signed with the Patient and all parties understand the risks and agree with anesthesia plan.  All questions answered.   ASA Score: 3  Day of Surgery Review of History & Physical: H&P Update referred to the surgeon/provider.    Ready For Surgery From Anesthesia Perspective.     .

## 2023-06-08 NOTE — PROVATION PATIENT INSTRUCTIONS
Discharge Summary/Instructions after an Endoscopic Procedure  Patient Name: Verito Perez  Patient MRN: [id number]  Patient YOB: 1960  Thursday, June 8, 2023  Fox Marquez MD  Dear patient,  As a result of recent federal legislation (The Federal Cures Act), you may   receive lab or pathology results from your procedure in your [facility]   account before your physician is able to contact you. Your physician or   their representative will relay the results to you with their   recommendations at their soonest availability.  Thank you,  Your health is very important to us during the Covid Crisis. Following your   procedure today, you will receive a daily text for 2 weeks asking about   signs or symptoms of Covid 19.  Please respond to this text when you   receive it so we can follow up and keep you as safe as possible.   RESTRICTIONS:  During your procedure today, you received medications for sedation.  These   medications may affect your judgment, balance and coordination.  Therefore,   for 24 hours, you have the following restrictions:   - DO NOT drive a car, operate machinery, make legal/financial decisions,   sign important papers or drink alcohol.    ACTIVITY:  Today: no heavy lifting, straining or running due to procedural   sedation/anesthesia.  The following day: return to full activity including work.  DIET:  Eat and drink normally unless instructed otherwise.     TREATMENT FOR COMMON SIDE EFFECTS:  - Mild abdominal pain, nausea, belching, bloating or excessive gas:  rest,   eat lightly and use a heating pad.  - Sore Throat: treat with throat lozenges and/or gargle with warm salt   water.  - Because air was used during the procedure, expelling large amounts of air   from your rectum or belching is normal.  - If a bowel prep was taken, you may not have a bowel movement for 1-3 days.    This is normal.  SYMPTOMS TO WATCH FOR AND REPORT TO YOUR PHYSICIAN:  1. Abdominal pain or bloating,  other than gas cramps.  2. Chest pain.  3. Back pain.  4. Signs of infection such as: chills or fever occurring within 24 hours   after the procedure.  5. Rectal bleeding, which would show as bright red, maroon, or black stools.   (A tablespoon of blood from the rectum is not serious, especially if   hemorrhoids are present.)  6. Vomiting.  7. Weakness or dizziness.  GO DIRECTLY TO THE NEAREST EMERGENCY ROOM IF YOU HAVE ANY OF THE FOLLOWING:      Difficulty breathing              Chills and/or fever over 101 F   Persistent vomiting and/or vomiting blood   Severe abdominal pain   Severe chest pain   Black, tarry stools   Bleeding- more than one tablespoon   Any other symptom or condition that you feel may need urgent attention  Your doctor recommends these additional instructions:  If any biopsies were taken, your doctors clinic will contact you in 1 to 2   weeks with any results.  - Discharge patient to home.   - Resume previous diet.   - Continue present medications.   - Await pathology results.   - Repeat colonoscopy in 3 years for surveillance.   - Patient has a contact number available for emergencies.  The signs and   symptoms of potential delayed complications were discussed with the   patient.  Return to normal activities tomorrow.  Written discharge   instructions were provided to the patient.  For questions, problems or results please call your physician - Fox Marquez MD.  EMERGENCY PHONE NUMBER: [phone],  LAB RESULTS: [phone]  IF A COMPLICATION OR EMERGENCY SITUATION ARISES AND YOU ARE UNABLE TO REACH   YOUR PHYSICIAN - GO DIRECTLY TO THE EMERGENCY ROOM.  Fox Marquez MD  6/8/2023 1:27:50 PM  This report has been verified and signed electronically.  Dear patient,  As a result of recent federal legislation (The Federal Cures Act), you may   receive lab or pathology results from your procedure in your [facility]   account before your physician is able to contact you.  Your physician or   their representative will relay the results to you with their   recommendations at their soonest availability.  Thank you,  PROVATION

## 2023-06-12 LAB
FINAL PATHOLOGIC DIAGNOSIS: NORMAL
GROSS: NORMAL
Lab: NORMAL

## 2023-06-30 ENCOUNTER — TELEPHONE (OUTPATIENT)
Dept: GASTROENTEROLOGY | Facility: CLINIC | Age: 63
End: 2023-06-30
Payer: COMMERCIAL

## 2023-08-26 NOTE — TELEPHONE ENCOUNTER
Care Due:                  Date            Visit Type   Department     Provider  --------------------------------------------------------------------------------    Last Visit: None Found      None         None Found  Next Visit: None Scheduled  None         None Found                                                            Last  Test          Frequency    Reason                     Performed    Due Date  --------------------------------------------------------------------------------    Office Visit  12 months..  citalopram...............  Not Found    Overdue    [employer] Embedded Care Due Messages. Reference number: [id number].   8/25/2023 9:24:45 PM CDT

## 2023-08-29 RX ORDER — CITALOPRAM 20 MG/1
TABLET, FILM COATED ORAL
Qty: 90 TABLET | Refills: 3 | Status: SHIPPED | OUTPATIENT
Start: 2023-08-29

## 2023-11-28 NOTE — TELEPHONE ENCOUNTER
Refill Routing Note   Medication(s) are not appropriate for processing by [facility] for the following reason(s):        Outside of protocol    [facility] action(s):  Route               Appointments  past 12m or future 3m with PCP    Date Provider   Last Visit   6/11/2019 Sammy Partida MD   Next Visit   Visit date not found Sammy Partida MD   ED visits in past 90 days: 0        Note composed:10:31 AM 11/28/2023

## 2023-12-01 ENCOUNTER — PATIENT MESSAGE (OUTPATIENT)
Dept: FAMILY MEDICINE | Facility: CLINIC | Age: 63
End: 2023-12-01
Payer: COMMERCIAL

## 2023-12-01 RX ORDER — GABAPENTIN 300 MG/1
CAPSULE ORAL
Qty: 30 CAPSULE | Refills: 0 | Status: SHIPPED | OUTPATIENT
Start: 2023-12-01 | End: 2024-01-12

## 2024-01-10 NOTE — TELEPHONE ENCOUNTER
Refill Routing Note   Medication(s) are not appropriate for processing by [facility] for the following reason(s):      - NO PCP LISTED IN EPIC; ROUTING TO DR PARTIDA AS LAST PRESCRIBING PROVIDER    [facility] action(s):  Route          Medication reconciliation completed: No     Appointments  past 12m or future 3m with PCP    Date Provider   Last Visit   6/11/2019 Sammy Partida MD   Next Visit   Visit date not found Sammy Partida MD   ED visits in past 90 days: 0        Note composed:10:28 AM 01/10/2024

## 2024-01-12 ENCOUNTER — PATIENT MESSAGE (OUTPATIENT)
Dept: FAMILY MEDICINE | Facility: CLINIC | Age: 64
End: 2024-01-12
Payer: COMMERCIAL

## 2024-01-12 RX ORDER — GABAPENTIN 300 MG/1
CAPSULE ORAL
Qty: 90 CAPSULE | Refills: 3 | Status: SHIPPED | OUTPATIENT
Start: 2024-01-12

## 2024-01-16 DIAGNOSIS — Z12.31 SCREENING MAMMOGRAM, ENCOUNTER FOR: Primary | ICD-10-CM

## 2024-02-07 ENCOUNTER — HOSPITAL ENCOUNTER (OUTPATIENT)
Dept: RADIOLOGY | Facility: HOSPITAL | Age: 64
Discharge: HOME | End: 2024-02-07
Attending: SPECIALIST
Payer: COMMERCIAL

## 2024-02-07 DIAGNOSIS — Z12.31 SCREENING MAMMOGRAM, ENCOUNTER FOR: ICD-10-CM

## 2024-02-07 PROCEDURE — 77067 SCR MAMMO BI INCL CAD: CPT | Mod: TC,PN

## 2024-02-07 PROCEDURE — 77067 SCR MAMMO BI INCL CAD: CPT | Mod: 26,,, | Performed by: RADIOLOGY

## 2024-02-07 PROCEDURE — 77063 BREAST TOMOSYNTHESIS BI: CPT | Mod: 26,,, | Performed by: RADIOLOGY

## 2024-08-31 NOTE — TELEPHONE ENCOUNTER
Refill Routing Note   Medication(s) are not appropriate for processing by [facility] for the following reason(s):      - NO PCP LISTED IN EPIC; ROUTING TO DR PARTIDA AS LAST PRESCRIBING PROVIDER    [facility] action(s):  Route          Medication reconciliation completed: No     Appointments  past 12m or future 3m with PCP    Date Provider   Last Visit   6/11/2019 Sammy Partida MD   Next Visit   Visit date not found Sammy Partida MD   ED visits in past 90 days: 0        Note composed:8:10 AM 08/31/2024

## 2024-09-01 RX ORDER — CITALOPRAM 20 MG/1
TABLET, FILM COATED ORAL
Qty: 90 TABLET | Refills: 3 | Status: SHIPPED | OUTPATIENT
Start: 2024-09-01

## 2024-09-03 ENCOUNTER — PATIENT MESSAGE (OUTPATIENT)
Dept: FAMILY MEDICINE | Facility: CLINIC | Age: 64
End: 2024-09-03
Payer: COMMERCIAL

## 2024-10-18 ENCOUNTER — OFFICE VISIT (OUTPATIENT)
Dept: OTOLARYNGOLOGY | Facility: CLINIC | Age: 64
End: 2024-10-18
Payer: COMMERCIAL

## 2024-10-18 VITALS
WEIGHT: 158.5 LBS | SYSTOLIC BLOOD PRESSURE: 132 MMHG | DIASTOLIC BLOOD PRESSURE: 91 MMHG | BODY MASS INDEX: 25.58 KG/M2 | HEART RATE: 70 BPM

## 2024-10-18 DIAGNOSIS — R06.83 SNORING: Primary | Chronic | ICD-10-CM

## 2024-10-18 DIAGNOSIS — J31.0 CHRONIC RHINITIS: Chronic | ICD-10-CM

## 2024-10-18 PROCEDURE — 3008F BODY MASS INDEX DOCD: CPT | Mod: CPTII,S$GLB,, | Performed by: OTOLARYNGOLOGY

## 2024-10-18 PROCEDURE — 99999 PR PBB SHADOW E&M-EST. PATIENT-LVL III: CPT | Mod: PBBFAC,,, | Performed by: OTOLARYNGOLOGY

## 2024-10-18 PROCEDURE — 3075F SYST BP GE 130 - 139MM HG: CPT | Mod: CPTII,S$GLB,, | Performed by: OTOLARYNGOLOGY

## 2024-10-18 PROCEDURE — 1160F RVW MEDS BY RX/DR IN RCRD: CPT | Mod: CPTII,S$GLB,, | Performed by: OTOLARYNGOLOGY

## 2024-10-18 PROCEDURE — 99214 OFFICE O/P EST MOD 30 MIN: CPT | Mod: 25,S$GLB,, | Performed by: OTOLARYNGOLOGY

## 2024-10-18 PROCEDURE — 31575 DIAGNOSTIC LARYNGOSCOPY: CPT | Mod: S$GLB,,, | Performed by: OTOLARYNGOLOGY

## 2024-10-18 PROCEDURE — 3080F DIAST BP >= 90 MM HG: CPT | Mod: CPTII,S$GLB,, | Performed by: OTOLARYNGOLOGY

## 2024-10-18 PROCEDURE — 1159F MED LIST DOCD IN RCRD: CPT | Mod: CPTII,S$GLB,, | Performed by: OTOLARYNGOLOGY

## 2024-10-18 RX ORDER — DOXYCYCLINE HYCLATE 100 MG
100 TABLET ORAL 2 TIMES DAILY
Qty: 20 TABLET | Refills: 0 | Status: SHIPPED | OUTPATIENT
Start: 2024-10-18 | End: 2024-10-28

## 2024-10-18 RX ORDER — MUPIROCIN 20 MG/G
OINTMENT TOPICAL 2 TIMES DAILY
Qty: 15 G | Refills: 0 | Status: SHIPPED | OUTPATIENT
Start: 2024-10-18 | End: 2024-10-28

## 2024-10-18 NOTE — PROGRESS NOTES
Chief Complaint   Patient presents with    Snoring     Sonring has been for years patient stated that has been getting worst over the past few months     Sinus Problem     Sinus drip into throat also stated nasal sometimes bleeds when blowing nose    .    HPI:     Verito Perez is a 63 y.o. female who presents for evaluation of a several month history of snoring. She reports it has been getting worse over this time period. She has been having frequent awakenings.+ witnessed apnea spells.  Her  reports the snoring as severe and he does go sleep in another room at times. She does feel that she is tired most days but does go to bed late and wake up early. On average she gets 8 hours of sleep per night. She has had nasal congestion and postnasal drip. She notes that the drip has been present for a few months. She has been using OTC Flonase and xyzal at night.  History of ESS(bilateral maxillary antrosstopmy, anterior ethmoidectomy)septo/SMRT in 2018.  She denies any sinus pressure or headaches. Denies change in sense of smell.           Past Medical History:   Diagnosis Date    Acne     Celiac disease     Rosacea      Social History     Socioeconomic History    Marital status:    Tobacco Use    Smoking status: Never    Smokeless tobacco: Never   Substance and Sexual Activity    Alcohol use: Yes     Comment: occ    Drug use: Never     Past Surgical History:   Procedure Laterality Date    CAUTERY OF TURBINATES N/A 6/18/2018    Procedure: CAUTERIZATION OF TURBINATES;  Surgeon: Yuliya Hawkins MD;  Location: [facility] OR;  Service: ENT;  Laterality: N/A;    COLONOSCOPY  4/4/13    COLONOSCOPY N/A 6/8/2023    Procedure: COLONOSCOPY;  Surgeon: Fox Marquez MD;  Location: [facility] ENDO;  Service: Endoscopy;  Laterality: N/A;    FUNCTIONAL ENDOSCOPIC SINUS SURGERY (FESS) USING COMPUTER-ASSISTED NAVIGATION N/A 6/18/2018    Procedure: SINUS SURGERY FUNCTIONAL ENDOSCOPIC WITH NAVIGATION;   Surgeon: Yuliya Hawkins MD;  Location: [facility] OR;  Service: ENT;  Laterality: N/A;  Beau notified (he's out of town, not available) video    HYSTERECTOMY      NASAL SEPTOPLASTY N/A 6/18/2018    Procedure: SEPTOPLASTY;  Surgeon: Yuliya Hawkins MD;  Location: [facility] OR;  Service: ENT;  Laterality: N/A;    TONSILLECTOMY       Family History   Problem Relation Name Age of Onset    Cancer Maternal Grandmother          thyroid           Review of Systems  General: negative for chills, fever or weight loss  Psychological: negative for mood changes or depression  Ophthalmic: negative for blurry vision, photophobia or eye pain  ENT: see HPI  Respiratory: no cough, shortness of breath, or wheezing  Cardiovascular: no chest pain or dyspnea on exertion  Gastrointestinal: no abdominal pain, change in bowel habits, or black/ bloody stools  Musculoskeletal: negative for gait disturbance or muscular weakness  Neurological: no syncope or seizures; no ataxia  Dermatological: negative for puritis,  rash and jaundice  Hematologic/lymphatic: no easy bruising, no new lumps or bumps      Physical Exam:    Vitals:    10/18/24 1155   BP: (!) 132/91   Pulse: 70       Constitutional: Well appearing / communicating without difficutly.  NAD.  Eyes: EOM I Bilaterally  Head/Face: Normocephalic.  Negative paranasal sinus pressure/tenderness.  Salivary glands WNL.  House Brackmann I Bilaterally.    Right Ear: Auricle normal appearance. External Auditory Canal within normal limits,TM w/o masses/lesions/perforations. TM mobility noted.   Left Ear: Auricle normal appearance. External Auditory Canal WNL,TM w/o masses/lesions/perforations. TM mobility noted.  Nose: mild mucosal edema.No gross nasal septal deviation. Inferior Turbinates 3+ bilaterally. No septal perforation. No masses/lesions. External nasal skin appears normal without masses/lesions.  Oral Cavity: Gingiva/lips within normal limits.  Dentition/gingiva healthy  appearing. Mucus membranes moist. Floor of mouth soft, no masses palpated. Oral Tongue mobile. Hard Palate appears normal.    Oropharynx: Base of tongue appears normal. No masses/lesions noted. Tonsillar fossa/pharyngeal wall without lesions. Posterior oropharynx WNL.  Soft palate without masses. Midline uvula.   Neck/Lymphatic: No LAD I-VI bilaterally.  No thyromegaly.  No masses noted on exam.      See separate procedure note for FFL to evaluate upper airway due to snoring/concern for TESSA.     Assessment:    ICD-10-CM ICD-9-CM    1. Snoring  R06.83 786.09 Home Sleep Study      Laryngoscopy    concern for TESSA      2. Chronic rhinitis  J31.0 472.0         The primary encounter diagnosis was Snoring. A diagnosis of Chronic rhinitis was also pertinent to this visit.      Plan:  Orders Placed This Encounter   Procedures    Laryngoscopy    Home Sleep Study     Recommend [facility] Sinus Rinse BID; distilled water only(maintenance).  Continue Flonase 2 sprays per nostril daiy (spray laterally).  Continue xyzal 5mg PO daily  Start doxycyline 100 mg PO BID for 10 days  Start Bactroban ointment to bilateral nostrils nightly  Obtain home sleep study to evaluate to snoring with concern for TESSA    Yuliya Hawkins MD

## 2024-10-23 ENCOUNTER — PATIENT MESSAGE (OUTPATIENT)
Dept: OTOLARYNGOLOGY | Facility: CLINIC | Age: 64
End: 2024-10-23
Payer: COMMERCIAL

## 2024-10-24 NOTE — PROCEDURES
Laryngoscopy    Date/Time: 10/18/2024 1:40 PM    Performed by: Yuliya Cordero MD  Authorized by: Yuliya Cordero MD    Consent Done?:  Yes (Verbal)  Anesthesia:     Local anesthetic:  Lidocaine 2% and Sergio-Synephrine 1/2%  Laryngoscopy:     Areas examined:  Nasal cavities, nasopharynx, oropharynx, hypopharynx, larynx and vocal cords  Nose External:      No external nasal deformity  Nose Intranasal:      Mucosa no polyps     Mucosa ulcers not present     No mucosa lesions present     No septum gross deformity     Turbinates not enlarged (mild mucosal edema)  Nasopharynx:      No mucosa lesions     Adenoids not present     Posterior choanae patent     Eustachian tube patent  Larynx/hypopharynx:      No epiglottis lesions     No epiglottis edema     No AE folds lesions     No vocal cord polyps     Equal and normal bilateral     No hypopharynx lesions     No piriform sinus pooling     No piriform sinus lesions     No post cricoid edema     No post cricoid erythema

## 2024-11-05 ENCOUNTER — TELEPHONE (OUTPATIENT)
Dept: SLEEP MEDICINE | Facility: OTHER | Age: 64
End: 2024-11-05
Payer: COMMERCIAL

## 2024-11-25 ENCOUNTER — TELEPHONE (OUTPATIENT)
Dept: SLEEP MEDICINE | Facility: OTHER | Age: 64
End: 2024-11-25
Payer: COMMERCIAL

## 2024-11-25 NOTE — TELEPHONE ENCOUNTER
Sent out a reminder through the portal about scheduling the home sleep study.   You can access the FollowMyHealth Patient Portal offered by [facility] by registering at the following website: [email or web address]. By joining [facility]’s FollowMyHealth portal, you will also be able to view your health information using other applications (apps) compatible with our system.

## 2024-12-23 ENCOUNTER — OFFICE VISIT (OUTPATIENT)
Dept: FAMILY MEDICINE | Facility: CLINIC | Age: 64
End: 2024-12-23
Payer: COMMERCIAL

## 2024-12-23 ENCOUNTER — LAB VISIT (OUTPATIENT)
Dept: LAB | Facility: HOSPITAL | Age: 64
End: 2024-12-23
Attending: FAMILY MEDICINE
Payer: COMMERCIAL

## 2024-12-23 VITALS
BODY MASS INDEX: 25.67 KG/M2 | HEIGHT: 66 IN | OXYGEN SATURATION: 98 % | SYSTOLIC BLOOD PRESSURE: 124 MMHG | TEMPERATURE: 98 F | HEART RATE: 85 BPM | WEIGHT: 159.75 LBS | DIASTOLIC BLOOD PRESSURE: 64 MMHG

## 2024-12-23 DIAGNOSIS — L71.9 ROSACEA: ICD-10-CM

## 2024-12-23 DIAGNOSIS — J32.8 OTHER CHRONIC SINUSITIS: ICD-10-CM

## 2024-12-23 DIAGNOSIS — Z00.00 ANNUAL PHYSICAL EXAM: ICD-10-CM

## 2024-12-23 DIAGNOSIS — Z00.00 ANNUAL PHYSICAL EXAM: Primary | ICD-10-CM

## 2024-12-23 LAB
25(OH)D3+25(OH)D2 SERPL-MCNC: 41 NG/ML (ref 30–96)
ALBUMIN SERPL BCP-MCNC: 4.2 G/DL (ref 3.5–5.2)
ALP SERPL-CCNC: 72 U/L (ref 38–126)
ALT SERPL W/O P-5'-P-CCNC: 17 U/L (ref 10–44)
ANION GAP SERPL CALC-SCNC: 6 MMOL/L (ref 8–16)
AST SERPL-CCNC: 27 U/L (ref 15–46)
BASOPHILS # BLD AUTO: 0.04 K/UL (ref 0–0.2)
BASOPHILS NFR BLD: 0.6 % (ref 0–1.9)
BILIRUB SERPL-MCNC: 0.4 MG/DL (ref 0.1–1)
BILIRUB UR QL STRIP: NEGATIVE
CALCIUM SERPL-MCNC: 9.2 MG/DL (ref 8.7–10.5)
CHLORIDE SERPL-SCNC: 101 MMOL/L (ref 95–110)
CHOLEST SERPL-MCNC: 150 MG/DL (ref 120–199)
CHOLEST/HDLC SERPL: 2.8 {RATIO} (ref 2–5)
CLARITY UR REFRACT.AUTO: CLEAR
CO2 SERPL-SCNC: 31 MMOL/L (ref 23–29)
COLOR UR AUTO: YELLOW
CREAT SERPL-MCNC: 1.14 MG/DL (ref 0.5–1.4)
DIFFERENTIAL METHOD BLD: ABNORMAL
EOSINOPHIL # BLD AUTO: 0.3 K/UL (ref 0–0.5)
EOSINOPHIL NFR BLD: 4.5 % (ref 0–8)
ERYTHROCYTE [DISTWIDTH] IN BLOOD BY AUTOMATED COUNT: 12.5 % (ref 11.5–14.5)
EST. GFR  (NO RACE VARIABLE): 54.4 ML/MIN/1.73 M^2
ESTIMATED AVG GLUCOSE: 97 MG/DL (ref 68–131)
GLUCOSE SERPL-MCNC: 108 MG/DL (ref 70–110)
GLUCOSE UR QL STRIP: NEGATIVE
HBA1C MFR BLD: 5 % (ref 4–5.6)
HCT VFR BLD AUTO: 44.9 % (ref 37–48.5)
HDLC SERPL-MCNC: 54 MG/DL (ref 40–75)
HDLC SERPL: 36 % (ref 20–50)
HGB BLD-MCNC: 14.3 G/DL (ref 12–16)
HGB UR QL STRIP: NEGATIVE
IMM GRANULOCYTES # BLD AUTO: 0.02 K/UL (ref 0–0.04)
IMM GRANULOCYTES NFR BLD AUTO: 0.3 % (ref 0–0.5)
KETONES UR QL STRIP: NEGATIVE
LDLC SERPL CALC-MCNC: 75.2 MG/DL (ref 63–159)
LEUKOCYTE ESTERASE UR QL STRIP: NEGATIVE
LYMPHOCYTES # BLD AUTO: 1.7 K/UL (ref 1–4.8)
LYMPHOCYTES NFR BLD: 24.4 % (ref 18–48)
MCH RBC QN AUTO: 28.8 PG (ref 27–31)
MCHC RBC AUTO-ENTMCNC: 31.8 G/DL (ref 32–36)
MCV RBC AUTO: 91 FL (ref 82–98)
MONOCYTES # BLD AUTO: 0.5 K/UL (ref 0.3–1)
MONOCYTES NFR BLD: 6.8 % (ref 4–15)
NEUTROPHILS # BLD AUTO: 4.4 K/UL (ref 1.8–7.7)
NEUTROPHILS NFR BLD: 63.4 % (ref 38–73)
NITRITE UR QL STRIP: NEGATIVE
NONHDLC SERPL-MCNC: 96 MG/DL
NRBC BLD-RTO: 0 /100 WBC
PH UR STRIP: 7 [PH] (ref 5–8)
PLATELET # BLD AUTO: 156 K/UL (ref 150–450)
PMV BLD AUTO: 12.4 FL (ref 9.2–12.9)
POTASSIUM SERPL-SCNC: 4.2 MMOL/L (ref 3.5–5.1)
PROT SERPL-MCNC: 7.4 G/DL (ref 6–8.4)
PROT UR QL STRIP: NEGATIVE
RBC # BLD AUTO: 4.96 M/UL (ref 4–5.4)
SODIUM SERPL-SCNC: 138 MMOL/L (ref 136–145)
SP GR UR STRIP: 1.01 (ref 1–1.03)
TRIGL SERPL-MCNC: 104 MG/DL (ref 30–150)
TSH SERPL DL<=0.005 MIU/L-ACNC: 2.68 UIU/ML (ref 0.4–4)
URN SPEC COLLECT METH UR: NORMAL
UROBILINOGEN UR STRIP-ACNC: 1 EU/DL
UUN UR-MCNC: 24 MG/DL (ref 7–17)
WBC # BLD AUTO: 6.93 K/UL (ref 3.9–12.7)

## 2024-12-23 PROCEDURE — 3078F DIAST BP <80 MM HG: CPT | Mod: CPTII,S$GLB,, | Performed by: FAMILY MEDICINE

## 2024-12-23 PROCEDURE — 85025 COMPLETE CBC W/AUTO DIFF WBC: CPT | Mod: PN | Performed by: FAMILY MEDICINE

## 2024-12-23 PROCEDURE — 3074F SYST BP LT 130 MM HG: CPT | Mod: CPTII,S$GLB,, | Performed by: FAMILY MEDICINE

## 2024-12-23 PROCEDURE — 1160F RVW MEDS BY RX/DR IN RCRD: CPT | Mod: CPTII,S$GLB,, | Performed by: FAMILY MEDICINE

## 2024-12-23 PROCEDURE — 36415 COLL VENOUS BLD VENIPUNCTURE: CPT | Mod: PN | Performed by: FAMILY MEDICINE

## 2024-12-23 PROCEDURE — 83036 HEMOGLOBIN GLYCOSYLATED A1C: CPT | Performed by: FAMILY MEDICINE

## 2024-12-23 PROCEDURE — 1159F MED LIST DOCD IN RCRD: CPT | Mod: CPTII,S$GLB,, | Performed by: FAMILY MEDICINE

## 2024-12-23 PROCEDURE — 80053 COMPREHEN METABOLIC PANEL: CPT | Mod: PN | Performed by: FAMILY MEDICINE

## 2024-12-23 PROCEDURE — 81003 URINALYSIS AUTO W/O SCOPE: CPT | Mod: PN | Performed by: FAMILY MEDICINE

## 2024-12-23 PROCEDURE — 84443 ASSAY THYROID STIM HORMONE: CPT | Mod: PN | Performed by: FAMILY MEDICINE

## 2024-12-23 PROCEDURE — 82306 VITAMIN D 25 HYDROXY: CPT | Mod: PN | Performed by: FAMILY MEDICINE

## 2024-12-23 PROCEDURE — 3008F BODY MASS INDEX DOCD: CPT | Mod: CPTII,S$GLB,, | Performed by: FAMILY MEDICINE

## 2024-12-23 PROCEDURE — 80061 LIPID PANEL: CPT | Performed by: FAMILY MEDICINE

## 2024-12-23 PROCEDURE — 99396 PREV VISIT EST AGE 40-64: CPT | Mod: S$GLB,,, | Performed by: FAMILY MEDICINE

## 2024-12-23 RX ORDER — AMOXICILLIN AND CLAVULANATE POTASSIUM 875; 125 MG/1; MG/1
1 TABLET, FILM COATED ORAL 2 TIMES DAILY
Qty: 20 TABLET | Refills: 0 | Status: SHIPPED | OUTPATIENT
Start: 2024-12-23 | End: 2025-01-02

## 2024-12-23 NOTE — PROGRESS NOTES
"Subjective:      Patient ID: Verito Perez is a 64 y.o. female.    Chief Complaint: Annual Exam      Vitals:    24 0917   BP: 124/64   Pulse: 85   Temp: 98.2 °F (36.8 °C)   TempSrc: Oral   SpO2: 98%   Weight: 72.4 kg (159 lb 11.6 oz)   Height: 5' 6" (1.676 m)        HPI   Annual; mother  this summer    Problem List  Patient Active Problem List   Diagnosis    Rosacea    Anxiety    Other chronic sinusitis        ALLERGIES:   Review of patient's allergies indicates:   Allergen Reactions    Sulfa (sulfonamide antibiotics)        MEDS:   Current Outpatient Medications:     citalopram (CELEXA) 20 MG tablet, TAKE 1 TABLET BY MOUTH EVERY DAY, Disp: 90 tablet, Rfl: 3    fluticasone (FLONASE) 50 mcg/actuation nasal spray, 2 sprays (100 mcg total) by Each Nare route once daily., Disp: 1 Bottle, Rfl: 12    gabapentin (NEURONTIN) 300 MG capsule, TAKE 1 CAPSULE BY MOUTH EVERY DAY AT BEDTIME, Disp: 90 capsule, Rfl: 3    levocetirizine (XYZAL) 5 MG tablet, Take 1 tablet (5 mg total) by mouth every evening., Disp: 30 tablet, Rfl: 11    amoxicillin-clavulanate 875-125mg (AUGMENTIN) 875-125 mg per tablet, Take 1 tablet by mouth 2 (two) times daily. for 10 days, Disp: 20 tablet, Rfl: 0      History:  Current Providers as of 2024  PCP: Sammy Partida MD  Care Team Provider: Nghia Dykes MD  Care Team Provider: Elena Prajapati MD  Care Team Provider: Jones Maier LPN  Care Team Provider: Silvina Rodriguez LPN  Care Team Provider: Fox Marquez MD  Encounter Provider: Sammy Partida MD, starting on Mon Dec 23, 2024 12:00 AM  Referring Provider: not found, starting on Mon Dec 23, 2024 12:00 AM  Consulting Physician: Sammy Partida MD, starting on Mon Dec 23, 2024  9:12 AM (Active)   Past Medical History:   Diagnosis Date    Acne     Celiac disease     Rosacea      Past Surgical History:   Procedure Laterality Date    CAUTERY OF TURBINATES N/A 2018    Procedure: CAUTERIZATION OF TURBINATES; "  Surgeon: Yuliya Hawkins MD;  Location: [facility] OR;  Service: ENT;  Laterality: N/A;    COLONOSCOPY  4/4/13    COLONOSCOPY N/A 6/8/2023    Procedure: COLONOSCOPY;  Surgeon: Fox Marquez MD;  Location: [facility] ENDO;  Service: Endoscopy;  Laterality: N/A;    FUNCTIONAL ENDOSCOPIC SINUS SURGERY (FESS) USING COMPUTER-ASSISTED NAVIGATION N/A 6/18/2018    Procedure: SINUS SURGERY FUNCTIONAL ENDOSCOPIC WITH NAVIGATION;  Surgeon: Yuliya Hawkins MD;  Location: [facility] OR;  Service: ENT;  Laterality: N/A;  Beau notified (he's out of town, not available) video    HYSTERECTOMY      NASAL SEPTOPLASTY N/A 6/18/2018    Procedure: SEPTOPLASTY;  Surgeon: Yuliya Hawkins MD;  Location: [facility] OR;  Service: ENT;  Laterality: N/A;    TONSILLECTOMY       Social History     Tobacco Use    Smoking status: Never     Passive exposure: Never    Smokeless tobacco: Never   Substance Use Topics    Alcohol use: Yes     Alcohol/week: 2.0 standard drinks of alcohol     Types: 2 Glasses of wine per week     Comment: occ    Drug use: Never         Review of Systems   Constitutional: Negative.    HENT: Negative.     Respiratory: Negative.     Cardiovascular: Negative.    Gastrointestinal: Negative.    Endocrine: Negative.    Genitourinary: Negative.    Musculoskeletal: Negative.    Allergic/Immunologic: Positive for environmental allergies.   Psychiatric/Behavioral: Negative.     All other systems reviewed and are negative.    Objective:     Physical Exam  Vitals and nursing note reviewed.   Constitutional:       Appearance: She is well-developed.   HENT:      Head: Normocephalic.      Right Ear: Tympanic membrane and ear canal normal.      Left Ear: Tympanic membrane and ear canal normal.   Eyes:      Conjunctiva/sclera: Conjunctivae normal.      Pupils: Pupils are equal, round, and reactive to light.   Cardiovascular:      Rate and Rhythm: Normal rate and regular rhythm.      Heart sounds: Normal heart sounds.    Pulmonary:      Effort: Pulmonary effort is normal.      Breath sounds: Normal breath sounds.   Musculoskeletal:         General: Normal range of motion.      Cervical back: Normal range of motion and neck supple.   Skin:     General: Skin is warm and dry.   Neurological:      General: No focal deficit present.      Mental Status: She is alert and oriented to person, place, and time. Mental status is at baseline.      Deep Tendon Reflexes: Reflexes are normal and symmetric.   Psychiatric:         Mood and Affect: Mood normal.         Behavior: Behavior normal.         Thought Content: Thought content normal.         Judgment: Judgment normal.             Assessment:     1. Annual physical exam    2. Other chronic sinusitis    3. Rosacea      Plan:        Medication List            Accurate as of December 23, 2024  9:56 AM. If you have any questions, ask your nurse or doctor.                START taking these medications      amoxicillin-clavulanate 875-125mg 875-125 mg per tablet  Commonly known as: AUGMENTIN  Take 1 tablet by mouth 2 (two) times daily. for 10 days  Started by: Sammy Partida MD            CONTINUE taking these medications      citalopram 20 MG tablet  Commonly known as: CeleXA  TAKE 1 TABLET BY MOUTH EVERY DAY     fluticasone propionate 50 mcg/actuation nasal spray  Commonly known as: FLONASE  2 sprays (100 mcg total) by Each Nare route once daily.     gabapentin 300 MG capsule  Commonly known as: NEURONTIN  TAKE 1 CAPSULE BY MOUTH EVERY DAY AT BEDTIME     levocetirizine 5 MG tablet  Commonly known as: XYZAL  Take 1 tablet (5 mg total) by mouth every evening.               Where to Get Your Medications        These medications were sent to [id number] - [place] [facility] [address] [address]      Phone: [phone]   amoxicillin-clavulanate 875-125mg 875-125 mg per tablet       Annual physical exam  -     CBC Auto Differential; Future; Expected  date: 12/23/2024  -     Comprehensive Metabolic Panel; Future; Expected date: 12/23/2024  -     Lipid Panel; Future  -     TSH; Future  -     Vitamin D; Future  -     Urinalysis, Reflex to Urine Culture Urine, Clean Catch; Future; Expected date: 12/23/2024  -     Hemoglobin A1C; Future    Other chronic sinusitis  -     CBC Auto Differential; Future; Expected date: 12/23/2024  -     Comprehensive Metabolic Panel; Future; Expected date: 12/23/2024  -     Lipid Panel; Future  -     TSH; Future  -     Vitamin D; Future  -     Urinalysis, Reflex to Urine Culture Urine, Clean Catch; Future; Expected date: 12/23/2024  -     Hemoglobin A1C; Future    Rosacea  -     CBC Auto Differential; Future; Expected date: 12/23/2024  -     Comprehensive Metabolic Panel; Future; Expected date: 12/23/2024  -     Lipid Panel; Future  -     TSH; Future  -     Vitamin D; Future  -     Urinalysis, Reflex to Urine Culture Urine, Clean Catch; Future; Expected date: 12/23/2024  -     Hemoglobin A1C; Future    Other orders  -     amoxicillin-clavulanate 875-125mg (AUGMENTIN) 875-125 mg per tablet; Take 1 tablet by mouth 2 (two) times daily. for 10 days  Dispense: 20 tablet; Refill: 0

## 2025-01-11 NOTE — TELEPHONE ENCOUNTER
No care due was identified.  [facility] Embedded Care Due Messages. Reference number: [id number].   1/11/2025 10:52:41 AM CST

## 2025-01-13 RX ORDER — GABAPENTIN 300 MG/1
CAPSULE ORAL
Qty: 90 CAPSULE | Refills: 3 | Status: SHIPPED | OUTPATIENT
Start: 2025-01-13

## 2025-02-10 ENCOUNTER — HOSPITAL ENCOUNTER (OUTPATIENT)
Dept: RADIOLOGY | Facility: HOSPITAL | Age: 65
Discharge: HOME | End: 2025-02-10
Attending: FAMILY MEDICINE
Payer: COMMERCIAL

## 2025-02-10 DIAGNOSIS — Z12.31 ENCOUNTER FOR SCREENING MAMMOGRAM FOR BREAST CANCER: ICD-10-CM

## 2025-02-10 PROCEDURE — 77067 SCR MAMMO BI INCL CAD: CPT | Mod: 26,,, | Performed by: RADIOLOGY

## 2025-02-10 PROCEDURE — 77067 SCR MAMMO BI INCL CAD: CPT | Mod: TC,PN

## 2025-02-10 PROCEDURE — 77063 BREAST TOMOSYNTHESIS BI: CPT | Mod: 26,,, | Performed by: RADIOLOGY

## 2025-03-03 ENCOUNTER — RESULTS FOLLOW-UP (OUTPATIENT)
Dept: FAMILY MEDICINE | Facility: CLINIC | Age: 65
End: 2025-03-03

## 2025-06-01 ENCOUNTER — HOSPITAL ENCOUNTER (EMERGENCY)
Facility: HOSPITAL | Age: 65
Discharge: HOME | End: 2025-06-01
Attending: STUDENT IN AN ORGANIZED HEALTH CARE EDUCATION/TRAINING PROGRAM
Payer: COMMERCIAL

## 2025-06-01 VITALS
BODY MASS INDEX: 25.71 KG/M2 | HEIGHT: 66 IN | RESPIRATION RATE: 18 BRPM | SYSTOLIC BLOOD PRESSURE: 128 MMHG | TEMPERATURE: 98 F | DIASTOLIC BLOOD PRESSURE: 88 MMHG | HEART RATE: 74 BPM | OXYGEN SATURATION: 99 % | WEIGHT: 160 LBS

## 2025-06-01 DIAGNOSIS — R51.9 NONINTRACTABLE EPISODIC HEADACHE, UNSPECIFIED HEADACHE TYPE: Primary | ICD-10-CM

## 2025-06-01 DIAGNOSIS — R68.84 PAIN IN LOWER JAW: ICD-10-CM

## 2025-06-01 DIAGNOSIS — M89.8X9 LYTIC LESION OF BONE ON X-RAY: ICD-10-CM

## 2025-06-01 DIAGNOSIS — M79.602 LEFT ARM PAIN: ICD-10-CM

## 2025-06-01 LAB
ABSOLUTE EOSINOPHIL (OHS): 0.36 K/UL
ABSOLUTE MONOCYTE (OHS): 0.44 K/UL (ref 0.3–1)
ABSOLUTE NEUTROPHIL COUNT (OHS): 3.44 K/UL (ref 1.8–7.7)
ALBUMIN SERPL BCP-MCNC: 3.9 G/DL (ref 3.5–5.2)
ALP SERPL-CCNC: 90 UNIT/L (ref 40–150)
ALT SERPL W/O P-5'-P-CCNC: 15 UNIT/L (ref 10–44)
ANION GAP (OHS): 9 MMOL/L (ref 8–16)
AST SERPL-CCNC: 27 UNIT/L (ref 11–45)
BASOPHILS # BLD AUTO: 0.04 K/UL
BASOPHILS NFR BLD AUTO: 0.7 %
BILIRUB SERPL-MCNC: 0.3 MG/DL (ref 0.1–1)
BILIRUB UR QL STRIP.AUTO: NEGATIVE
BUN SERPL-MCNC: 23 MG/DL (ref 8–23)
CALCIUM SERPL-MCNC: 9.4 MG/DL (ref 8.7–10.5)
CHLORIDE SERPL-SCNC: 105 MMOL/L (ref 95–110)
CLARITY UR: CLEAR
CO2 SERPL-SCNC: 25 MMOL/L (ref 23–29)
COLOR UR AUTO: COLORLESS
CREAT SERPL-MCNC: 0.8 MG/DL (ref 0.5–1.4)
ERYTHROCYTE [DISTWIDTH] IN BLOOD BY AUTOMATED COUNT: 12.8 % (ref 11.5–14.5)
GFR SERPLBLD CREATININE-BSD FMLA CKD-EPI: >60 ML/MIN/1.73/M2
GLUCOSE SERPL-MCNC: 84 MG/DL (ref 70–110)
GLUCOSE UR QL STRIP: NEGATIVE
HCT VFR BLD AUTO: 45 % (ref 37–48.5)
HGB BLD-MCNC: 14.6 GM/DL (ref 12–16)
HGB UR QL STRIP: NEGATIVE
HOLD SPECIMEN: NORMAL
IMM GRANULOCYTES # BLD AUTO: 0.01 K/UL (ref 0–0.04)
IMM GRANULOCYTES NFR BLD AUTO: 0.2 % (ref 0–0.5)
KETONES UR QL STRIP: NEGATIVE
LEUKOCYTE ESTERASE UR QL STRIP: NEGATIVE
LYMPHOCYTES # BLD AUTO: 1.32 K/UL (ref 1–4.8)
MAGNESIUM SERPL-MCNC: 2 MG/DL (ref 1.6–2.6)
MCH RBC QN AUTO: 28.8 PG (ref 27–31)
MCHC RBC AUTO-ENTMCNC: 32.4 G/DL (ref 32–36)
MCV RBC AUTO: 89 FL (ref 82–98)
NITRITE UR QL STRIP: NEGATIVE
NUCLEATED RBC (/100WBC) (OHS): 0 /100 WBC
PH UR STRIP: 7 [PH]
PLATELET # BLD AUTO: 141 K/UL (ref 150–450)
PMV BLD AUTO: 12.9 FL (ref 9.2–12.9)
POTASSIUM SERPL-SCNC: 4.8 MMOL/L (ref 3.5–5.1)
PROT SERPL-MCNC: 7.9 GM/DL (ref 6–8.4)
PROT UR QL STRIP: NEGATIVE
RBC # BLD AUTO: 5.07 M/UL (ref 4–5.4)
RELATIVE EOSINOPHIL (OHS): 6.4 %
RELATIVE LYMPHOCYTE (OHS): 23.5 % (ref 18–48)
RELATIVE MONOCYTE (OHS): 7.8 % (ref 4–15)
RELATIVE NEUTROPHIL (OHS): 61.4 % (ref 38–73)
SODIUM SERPL-SCNC: 139 MMOL/L (ref 136–145)
SP GR UR STRIP: <1.005
TROPONIN I SERPL DL<=0.01 NG/ML-MCNC: <0.006 NG/ML
TROPONIN I SERPL DL<=0.01 NG/ML-MCNC: <0.006 NG/ML
TSH SERPL-ACNC: 2.93 UIU/ML (ref 0.4–4)
UROBILINOGEN UR STRIP-ACNC: NEGATIVE EU/DL
WBC # BLD AUTO: 5.61 K/UL (ref 3.9–12.7)

## 2025-06-01 PROCEDURE — 84443 ASSAY THYROID STIM HORMONE: CPT | Performed by: STUDENT IN AN ORGANIZED HEALTH CARE EDUCATION/TRAINING PROGRAM

## 2025-06-01 PROCEDURE — 93005 ELECTROCARDIOGRAM TRACING: CPT

## 2025-06-01 PROCEDURE — 85025 COMPLETE CBC W/AUTO DIFF WBC: CPT | Performed by: STUDENT IN AN ORGANIZED HEALTH CARE EDUCATION/TRAINING PROGRAM

## 2025-06-01 PROCEDURE — 81003 URINALYSIS AUTO W/O SCOPE: CPT | Performed by: STUDENT IN AN ORGANIZED HEALTH CARE EDUCATION/TRAINING PROGRAM

## 2025-06-01 PROCEDURE — 80053 COMPREHEN METABOLIC PANEL: CPT | Performed by: STUDENT IN AN ORGANIZED HEALTH CARE EDUCATION/TRAINING PROGRAM

## 2025-06-01 PROCEDURE — 83735 ASSAY OF MAGNESIUM: CPT | Performed by: STUDENT IN AN ORGANIZED HEALTH CARE EDUCATION/TRAINING PROGRAM

## 2025-06-01 PROCEDURE — 99285 EMERGENCY DEPT VISIT HI MDM: CPT | Mod: 25

## 2025-06-01 PROCEDURE — 93010 ELECTROCARDIOGRAM REPORT: CPT | Mod: ,,, | Performed by: STUDENT IN AN ORGANIZED HEALTH CARE EDUCATION/TRAINING PROGRAM

## 2025-06-01 PROCEDURE — 84484 ASSAY OF TROPONIN QUANT: CPT | Performed by: STUDENT IN AN ORGANIZED HEALTH CARE EDUCATION/TRAINING PROGRAM

## 2025-06-01 NOTE — ED PROVIDER NOTES
NAME:  Verito Perez  CSN:     [id number]  MRN:    [id number]  ADMIT DATE: 6/1/2025        eMERGENCY dEPARTMENT eNCOUnter    CHIEF COMPLAINT    Chief Complaint   Patient presents with    Numbness     C/o numbness and tingling to LUE and face starting 1h ago. Pt reports numbness had happened a few days ago. Pain to L jaw radiating into frontal head. Van -.     Dr. Atkinson to triage for assessment; do not call code stroke at this time.       HPI    Verito Perez is a 64 y.o. female with a past medical history of  has a past medical history of Acne, Celiac disease, and Rosacea.     she presents to the ED due to intermittent pain and paresthesias to the left upper extremity over the last few days.  Notes she has also had some left jaw pain as well as a knot in the left neck.  About an hour ago started to have some associated pain and paresthesias noted to the left face while driving.  No chest pain or shortness of breath.      HPI       PAST MEDICAL HISTORY  Past Medical History:   Diagnosis Date    Acne     Celiac disease     Rosacea        SURGICAL HISTORY    Past Surgical History:   Procedure Laterality Date    CAUTERY OF TURBINATES N/A 6/18/2018    Procedure: CAUTERIZATION OF TURBINATES;  Surgeon: Yuliya Hawkins MD;  Location: [facility] OR;  Service: ENT;  Laterality: N/A;    COLONOSCOPY  4/4/13    COLONOSCOPY N/A 6/8/2023    Procedure: COLONOSCOPY;  Surgeon: Fox Marquez MD;  Location: [facility];  Service: Endoscopy;  Laterality: N/A;    FUNCTIONAL ENDOSCOPIC SINUS SURGERY (FESS) USING COMPUTER-ASSISTED NAVIGATION N/A 6/18/2018    Procedure: SINUS SURGERY FUNCTIONAL ENDOSCOPIC WITH NAVIGATION;  Surgeon: Yuliya Hawkins MD;  Location: [facility] OR;  Service: ENT;  Laterality: N/A;  Beau notified (he's out of town, not available) video    HYSTERECTOMY      NASAL SEPTOPLASTY N/A 6/18/2018    Procedure: SEPTOPLASTY;  Surgeon: Yuliya Hawkins MD;  Location: [facility] OR;  Service: ENT;   Laterality: N/A;    TONSILLECTOMY         FAMILY HISTORY    Family History   Problem Relation Name Age of Onset    Heart disease Mother      Stroke Mother      Alcohol abuse Father Tanner     Diabetes Father Tanner     Heart disease Son Jorden         tetraology of fallot    Cancer Maternal Grandmother Chary         thyroid       SOCIAL HISTORY    Social History     Socioeconomic History    Marital status:    Occupational History     Comment: [employer]   Tobacco Use    Smoking status: Never     Passive exposure: Never    Smokeless tobacco: Never   Substance and Sexual Activity    Alcohol use: Yes     Alcohol/week: 2.0 standard drinks of alcohol     Types: 2 Glasses of wine per week     Comment: occ    Drug use: Never    Sexual activity: Yes     Partners: Male     Birth control/protection: Partner-Vasectomy, See Surgical Hx     Social Drivers of Health     Financial Resource Strain: Low Risk  (2/28/2025)    Received from [facility]    Overall Financial Resource Strain (CARDIA)     Difficulty of Paying Living Expenses: Not hard at all   Food Insecurity: No Food Insecurity (2/28/2025)    Received from [facility]    Hunger Vital Sign     Worried About Running Out of Food in the Last Year: Never true     Ran Out of Food in the Last Year: Never true   Transportation Needs: No Transportation Needs (2/28/2025)    Received from [facility]    PRAPARE - Transportation     Lack of Transportation (Medical): No     Lack of Transportation (Non-Medical): No   Physical Activity: Insufficiently Active (12/21/2024)    Exercise Vital Sign     Days of Exercise per Week: 2 days     Minutes of Exercise per Session: 10 min   Stress: No Stress Concern Present (2/28/2025)    Received from [facility]    [place] [employer] - Occupational Stress Questionnaire     Feeling of Stress : Not at all   Housing Stability: Unknown (2/28/2025)    Received from [facility]    Housing Stability Vital Sign      "Unable to Pay for Housing in the Last Year: No       MEDICATIONS  Current Outpatient Medications   Medication Instructions    citalopram (CELEXA) 20 MG tablet TAKE 1 TABLET BY MOUTH EVERY DAY    fluticasone propionate (FLONASE) 100 mcg, Each Nostril, Daily    gabapentin (NEURONTIN) 300 MG capsule TAKE 1 CAPSULE BY MOUTH EVERY DAY AT BEDTIME    levocetirizine (XYZAL) 5 mg, Oral, Nightly       ALLERGIES    Review of patient's allergies indicates:   Allergen Reactions    Sulfa (sulfonamide antibiotics)          REVIEW OF SYSTEMS   Review of Systems       PHYSICAL EXAM    Reviewed Triage Note    VITAL SIGNS:   ED Triage Vitals [06/01/25 1316]   Encounter Vitals Group      /77      Systolic BP Percentile       Diastolic BP Percentile       Pulse 86      Resp 18      Temp 98.4 °F (36.9 °C)      Temp src       SpO2 98 %      Weight 160 lb      Height 5' 6"      Head Circumference       Peak Flow       Pain Score       Pain Loc       Pain Education       Exclude from Growth Chart        Patient Vitals for the past 24 hrs:   BP Temp Pulse Resp SpO2 Height Weight   06/01/25 1811 128/88 -- 74 -- 99 % -- --   06/01/25 1807 -- -- -- 18 -- -- --   06/01/25 1802 131/73 -- 66 -- 99 % -- --   06/01/25 1530 -- -- 65 20 99 % -- --   06/01/25 1428 (!) 156/88 -- 68 -- 100 % -- --   06/01/25 1316 128/77 98.4 °F (36.9 °C) 86 18 98 % 5' 6" (1.676 m) 72.6 kg (160 lb)       Physical Exam    Nursing note and vitals reviewed.  Constitutional: She appears well-developed and well-nourished.   HENT:   Head: Normocephalic and atraumatic.   No facial swelling   Eyes: EOM are normal. Pupils are equal, round, and reactive to light.   Neck: Neck supple.   Normal range of motion.  Cardiovascular:  Normal rate and regular rhythm.           Pulmonary/Chest: Breath sounds normal. No respiratory distress.   Abdominal: Abdomen is soft. There is no abdominal tenderness.   Musculoskeletal:         General: Normal range of motion.      Cervical back: " Normal range of motion and neck supple.     Neurological: She is alert and oriented to person, place, and time. She has normal strength. No cranial nerve deficit or sensory deficit. GCS score is 15. GCS eye subscore is 4. GCS verbal subscore is 5. GCS motor subscore is 6.   Reports left-sided facial paresthesias.  On exam denies any sensory deficit or change of sensation to the left face compared to the right on exam.  No pronator drift.   Skin: Skin is warm and dry.   Psychiatric: She has a normal mood and affect.          EKG     Interpreted by EM physician if performed:   Normal sinus rhythm. No ST elevation or depression.  No T-wave inversions.  No evidence of ischemia. Rate 69.  There is some baseline motion artifact.            LABS  Pertinent labs reviewed. (See chart for details)   Labs Reviewed   URINALYSIS, REFLEX TO URINE CULTURE - Abnormal       Result Value    Color, UA Colorless (*)     Appearance, UA Clear      pH, UA 7.0      Spec Grav UA <1.005 (*)     Protein, UA Negative      Glucose, UA Negative      Ketones, UA Negative      Bilirubin, UA Negative      Blood, UA Negative      Nitrites, UA Negative      Urobilinogen, UA Negative      Leukocyte Esterase, UA Negative     CBC WITH DIFFERENTIAL - Abnormal    WBC 5.61      RBC 5.07      HGB 14.6      HCT 45.0      MCV 89      MCH 28.8      MCHC 32.4      RDW 12.8      Platelet Count 141 (*)     MPV 12.9      Nucleated RBC 0      Neut % 61.4      Lymph % 23.5      Mono % 7.8      Eos % 6.4      Basophil % 0.7      Imm Grans % 0.2      Neut # 3.44      Lymph # 1.32      Mono # 0.44      Eos # 0.36      Baso # 0.04      Imm Grans # 0.01     COMPREHENSIVE METABOLIC PANEL - Normal    Sodium 139      Potassium 4.8      Chloride 105      CO2 25      Glucose 84      BUN 23      Creatinine 0.8      Calcium 9.4      Protein Total 7.9      Albumin 3.9      Bilirubin Total 0.3      ALP 90      AST 27      ALT 15      Anion Gap 9      eGFR >60      Narrative:      Specimen slightly hemolyzed.   TROPONIN I - Normal    Troponin-I <0.006     TSH - Normal    TSH 2.934     MAGNESIUM - Normal    Magnesium  2.0      Narrative:     Specimen slightly hemolyzed.   TROPONIN I - Normal    Troponin-I <0.006     CBC W/ AUTO DIFFERENTIAL    Narrative:     The following orders were created for panel order CBC auto differential.  Procedure                               Abnormality         Status                     ---------                               -----------         ------                     CBC with Differential[[id number]]       Abnormal            Final result                 Please view results for these tests on the individual orders.   GREY TOP URINE HOLD    Extra Tube Hold for add-ons.           RADIOLOGY          Imaging Results               CT Cervical Spine Without Contrast (Final result)  Result time 06/01/25 15:27:42      Final result by Mata Talamantes MD (06/01/25 15:27:42)                   Impression:      1. No acute intracranial abnormality.  2. No CT evidence of cervical spine acute osseous traumatic injury.  3. Minimal to mild cervical spondylosis most prominent at C4-5 level.  4. Multiple scattered subcentimeter lytic foci throughout the calvarium, cervical spine and imaged upper thoracic spine.  Findings are nonspecific but can be seen with lytic metastases or multiple myeloma.  Further evaluation with elective/nonemergent bone scan can be obtained as warranted.  This report was flagged in Epic as containing an incidental finding.      Electronically signed by: Mata Talamantes MD  Date:    06/01/2025  Time:    15:27               Narrative:    EXAMINATION:  CT HEAD WITHOUT CONTRAST; CT CERVICAL SPINE WITHOUT CONTRAST    CLINICAL HISTORY:  Neuro deficit, acute, stroke suspected;; Cervical radiculopathy, no red flags;    TECHNIQUE:  Low dose axial CT images obtained throughout the head and cervical spine without intravenous contrast. Sagittal and coronal  reconstructions were performed.    COMPARISON:  Sinus CT 06/08/2018    FINDINGS:  Head CT:    Intracranial compartment: Brain appears normally formed.    Ventricles and sulci are normal in size for age without evidence of hydrocephalus. No extra-axial blood or fluid collections.    The brain parenchyma appears normal. No parenchymal mass, hemorrhage, edema or major vascular distribution infarct.    Skull/extracranial contents (limited evaluation): No fracture.  Several scattered subcentimeter lytic foci of the calvarium.  Suspected retention cysts versus polyps within the maxillary sinuses, more so on the right.  Mastoid air cells and remaining imaged paranasal sinuses are essentially clear.  Imaged portions of the orbits are within normal limits.    Cervical spine CT: Straightening of the cervical lordosis.  Vertebral body and intervertebral disc space heights appear relatively maintained.  Degenerative related minimal grade 1 anterolisthesis of C3 on 4.  No displaced fracture or dislocation.  Mild degenerative change at the atlantodental interval.  Dens and lateral masses are otherwise well aligned and intact.  Multiple scattered subcentimeter lytic foci throughout the imaged cervical and upper thoracic spine.  Overall minimal to mild degenerative change.  No prevertebral soft tissue thickening.  No paraspinal mass or fluid collection.  No subcutaneous emphysema or radiopaque foreign body.    C2-3: No significant spinal canal stenosis or neural foraminal narrowing.    C3-4: No significant spinal canal stenosis.  Minimal bilateral neural foraminal narrowing.    C4-5: Posterior disc protrusion resulting in mild acquired canal stenosis.  Minimal left neural foraminal narrowing.    C5-6: Mild left neural foraminal narrowing.  No significant spinal canal stenosis or right neural foraminal narrowing.    C6-7: No significant spinal canal stenosis or neural foraminal narrowing.    C7-T1: No significant spinal canal  stenosis or neural foraminal narrowing.    Included airway is midline and patent.  Biapical pleuroparenchymal scarring without pneumothorax or consolidation.                                        CT Head Without Contrast (Final result)  Result time 06/01/25 15:27:42      Final result by Mata Talamantes MD (06/01/25 15:27:42)                   Impression:      1. No acute intracranial abnormality.  2. No CT evidence of cervical spine acute osseous traumatic injury.  3. Minimal to mild cervical spondylosis most prominent at C4-5 level.  4. Multiple scattered subcentimeter lytic foci throughout the calvarium, cervical spine and imaged upper thoracic spine.  Findings are nonspecific but can be seen with lytic metastases or multiple myeloma.  Further evaluation with elective/nonemergent bone scan can be obtained as warranted.  This report was flagged in Epic as containing an incidental finding.      Electronically signed by: Mata Talamantes MD  Date:    06/01/2025  Time:    15:27               Narrative:    EXAMINATION:  CT HEAD WITHOUT CONTRAST; CT CERVICAL SPINE WITHOUT CONTRAST    CLINICAL HISTORY:  Neuro deficit, acute, stroke suspected;; Cervical radiculopathy, no red flags;    TECHNIQUE:  Low dose axial CT images obtained throughout the head and cervical spine without intravenous contrast. Sagittal and coronal reconstructions were performed.    COMPARISON:  Sinus CT 06/08/2018    FINDINGS:  Head CT:    Intracranial compartment: Brain appears normally formed.    Ventricles and sulci are normal in size for age without evidence of hydrocephalus. No extra-axial blood or fluid collections.    The brain parenchyma appears normal. No parenchymal mass, hemorrhage, edema or major vascular distribution infarct.    Skull/extracranial contents (limited evaluation): No fracture.  Several scattered subcentimeter lytic foci of the calvarium.  Suspected retention cysts versus polyps within the maxillary sinuses, more so on the right.   Mastoid air cells and remaining imaged paranasal sinuses are essentially clear.  Imaged portions of the orbits are within normal limits.    Cervical spine CT: Straightening of the cervical lordosis.  Vertebral body and intervertebral disc space heights appear relatively maintained.  Degenerative related minimal grade 1 anterolisthesis of C3 on 4.  No displaced fracture or dislocation.  Mild degenerative change at the atlantodental interval.  Dens and lateral masses are otherwise well aligned and intact.  Multiple scattered subcentimeter lytic foci throughout the imaged cervical and upper thoracic spine.  Overall minimal to mild degenerative change.  No prevertebral soft tissue thickening.  No paraspinal mass or fluid collection.  No subcutaneous emphysema or radiopaque foreign body.    C2-3: No significant spinal canal stenosis or neural foraminal narrowing.    C3-4: No significant spinal canal stenosis.  Minimal bilateral neural foraminal narrowing.    C4-5: Posterior disc protrusion resulting in mild acquired canal stenosis.  Minimal left neural foraminal narrowing.    C5-6: Mild left neural foraminal narrowing.  No significant spinal canal stenosis or right neural foraminal narrowing.    C6-7: No significant spinal canal stenosis or neural foraminal narrowing.    C7-T1: No significant spinal canal stenosis or neural foraminal narrowing.    Included airway is midline and patent.  Biapical pleuroparenchymal scarring without pneumothorax or consolidation.                                       X-Ray Chest AP Portable (Final result)  Result time 06/01/25 14:31:16      Final result by Wyatt Verduzco DO (06/01/25 14:31:16)                   Impression:      No acute cardiopulmonary process.      Electronically signed by: Wyatt Verduzco DO  Date:    06/01/2025  Time:    14:31               Narrative:    EXAMINATION:  XR CHEST AP PORTABLE    CLINICAL HISTORY:  Pain in left arm    TECHNIQUE:  Single frontal view of the  chest was performed.    COMPARISON:  None    FINDINGS:  No infiltrates or pleural effusions are identified.  The heart does not appear to be enlarged for a portable exam.                                        PROCEDURES    Procedures      ED COURSE & MEDICAL DECISION MAKING    Pertinent Labs & Imaging studies reviewed. (See chart for details and specific orders.)          Summary of review of records:   Follows with primary care.  Last visit in January of 2025.  No history of diabetes, hyperlipidemia.    Medical Decision Making  Amount and/or Complexity of Data Reviewed  Labs: ordered. Decision-making details documented in ED Course.  Radiology: ordered. Decision-making details documented in ED Course.  ECG/medicine tests:  Decision-making details documented in ED Course.      Verito Perez is a 64 y.o. female Who presents for evaluation of left jaw pain with associated pain and paresthesias over the face and down the arm.  Notes intermittent pain to the left arm over the last few days.  Denies neck pain.  No focal neuro deficits on exam    Differential includes but is not limited to  radiculopathy, cardiac equivalent, migraine, clinically low suspicion for stroke,            Medications - No data to display    ED Course as of 06/01/25 2140   Sun Jun 01, 2025   1424 Troponin I: <0.006  within normal limits  [HL]   1424 Magnesium : 2.0  within normal limits  [HL]   1424 TSH: 2.934  within normal limits  [HL]   1425 Comprehensive metabolic panel  No acute abnormalities  [HL]   1425 CBC auto differential(!)  Mild thrombocyopenia [HL]   1458 Urinalysis, Reflex to Urine Culture Urine, Clean Catch(!)  No e/o infection [HL]   1509 X-Ray Chest AP Portable  No acute cardiopulmonary process. [HL]   1510 EKG 12-lead [HL]   1510 On reassessment, paresthesias have involved.  Notes migration of pain to the base of the skull and in the frontal area. [HL]   1540 CT Head Without Contrast(!)  1. No acute intracranial  abnormality.  2. No CT evidence of cervical spine acute osseous traumatic injury.  3. Minimal to mild cervical spondylosis most prominent at C4-5 level.  4. Multiple scattered subcentimeter lytic foci throughout the calvarium, cervical spine and imaged upper thoracic spine.  Findings are nonspecific but can be seen with lytic metastases or multiple myeloma.     [HL]   1714 Troponin I: <0.006 [HL]   1754 Patient feels improved with minimal pain to the left jaw.  Symptoms have otherwise resolved.  Did discuss incidental findings on CT imaging with has been and spouse at bedside and underlying concern for possible cancer diagnoses.  Referral to suspected oncologic diagnosis clinic placed.  Reasons to return discussed and all questions addressed. [HL]      ED Course User Index  [HL] Anna Atkinson DO       FINAL IMPRESSION    Final diagnoses:  [M79.602] Left arm pain  [M89.8X9] Lytic lesion of bone on x-ray  [R51.9] Nonintractable episodic headache, unspecified headache type (Primary)  [R68.84] Pain in lower jaw       DISPOSITION  Patient discharged in stable condition        ED Prescriptions    None       Follow-up Information       Follow up With Specialties Details Why Contact Info    Sammy Partida MD Family Medicine   [address] [place] [id number] [phone]      [facility] Emergency Dept Emergency Medicine  If symptoms worsen, As needed [address] [facility] [address] [phone]              DISCLAIMER: This note was prepared with [employer] voice recognition transcription software. Garbled syntax, mangled pronouns, and other bizarre constructions may be attributed to that software system.             Anna Atkinson DO  06/01/25 [id number]

## 2025-06-01 NOTE — DISCHARGE INSTRUCTIONS
You CAT scan showed Multiple scattered subcentimeter lytic foci throughout the calvarium, cervical spine and imaged upper thoracic spine.  Findings are nonspecific but can be seen with lytic metastases or multiple myeloma.

## 2025-06-02 LAB
OHS QRS DURATION: 72 MS
OHS QTC CALCULATION: 426 MS

## 2025-06-06 ENCOUNTER — TELEPHONE (OUTPATIENT)
Facility: OTHER | Age: 65
End: 2025-06-06
Payer: COMMERCIAL

## 2025-06-07 NOTE — PROGRESS NOTES
Patient seen in the ED on 6/1/2025 for headache and numbness to face.  Patient's call escalated to post ED text tracker.  Patient requesting appointment with hematology/oncology.  Unable to schedule appointment.  In-basket referral message sent to [facility] Hematology/oncology for patient follow up to assist with scheduling.      Patient instructed to follow up if have any further concerns/needs to be addressed.  Patient appreciative and denied no other needs.  Encounter closed at this time.

## 2025-06-09 ENCOUNTER — TELEPHONE (OUTPATIENT)
Dept: HEMATOLOGY/ONCOLOGY | Facility: CLINIC | Age: 65
End: 2025-06-09
Payer: COMMERCIAL

## 2025-06-09 NOTE — PROGRESS NOTES
Subjective:   @Patient ID:  Verito Perez is a 64 y.o. female who presents for evaluation of left jaw pain      HPI:   06/09/2025:  Initial evaluation.  Recent ER visit with numbness/tingling LUE, and left jaw pain.     SH: No tobacco abuse, occasional etoh     FH: mother with TAA dissection at age 80.     PMH:   Past Medical History:   Diagnosis Date    Acne     Celiac disease     Rosacea           Prior cardiovascular  Hx  --------------------------------    - EKG June 2025: Sinus rhythm without ischemic changes           The 10-year ASCVD risk score (Juan Ramon SEVILLA, et al., 2019) is: 3.4%    Values used to calculate the score:      Age: 64 years      Sex: Female      Is Non- : No      Diabetic: No      Tobacco smoker: No      Systolic Blood Pressure: 128 mmHg      Is BP treated: No      HDL Cholesterol: 54 mg/dL      Total Cholesterol: 150 mg/dL      Patient Active Problem List    Diagnosis Date Noted    Other chronic sinusitis 06/18/2018    Anxiety 03/14/2018    Rosacea 02/28/2018                    LAST HbA1c  Lab Results   Component Value Date    HGBA1C 5.0 12/23/2024       Lipid panel  Lab Results   Component Value Date    CHOL 150 12/23/2024    CHOL 153 03/26/2018     Lab Results   Component Value Date    HDL 54 12/23/2024    HDL 67 03/26/2018     Lab Results   Component Value Date    LDLCALC 75.2 12/23/2024    LDLCALC 69 03/26/2018     Lab Results   Component Value Date    TRIG 104 12/23/2024    TRIG 83 03/26/2018     Lab Results   Component Value Date    CHOLHDL 36.0 12/23/2024    CHOLHDL 2.3 03/26/2018            ROS    Objective:   Physical Exam    Assessment:     No diagnosis found.    Plan:       Pertinent cardiac images and EKG reviewed independently.    Continue with current medical plan and lifestyle changes.  Return sooner for concerns or questions. If symptoms persist go to the ED  I have reviewed all pertinent data including patient's medical history in detail and updated  the computerized patient record.     No orders of the defined types were placed in this encounter.      Follow up as scheduled.       -In today's visit, monitoring for drug toxicity was accomplished.     Greater than 50% of the visit was spent counseling, educating, and coordinating the care of the patient.     She expressed verbal understanding and agreed with the plan    Patient's Medications   New Prescriptions    No medications on file   Previous Medications    CITALOPRAM (CELEXA) 20 MG TABLET    TAKE 1 TABLET BY MOUTH EVERY DAY    FLUTICASONE (FLONASE) 50 MCG/ACTUATION NASAL SPRAY    2 sprays (100 mcg total) by Each Nare route once daily.    GABAPENTIN (NEURONTIN) 300 MG CAPSULE    TAKE 1 CAPSULE BY MOUTH EVERY DAY AT BEDTIME    LEVOCETIRIZINE (XYZAL) 5 MG TABLET    Take 1 tablet (5 mg total) by mouth every evening.   Modified Medications    No medications on file   Discontinued Medications    No medications on file        Td Zazueta MD, FACC, RPVI  Heart and Vascular Interventional Cardiology

## 2025-06-10 ENCOUNTER — OFFICE VISIT (OUTPATIENT)
Dept: CARDIOLOGY | Facility: CLINIC | Age: 65
End: 2025-06-10
Payer: COMMERCIAL

## 2025-06-10 VITALS
DIASTOLIC BLOOD PRESSURE: 74 MMHG | HEIGHT: 65 IN | WEIGHT: 163 LBS | BODY MASS INDEX: 27.16 KG/M2 | HEART RATE: 71 BPM | OXYGEN SATURATION: 97 % | SYSTOLIC BLOOD PRESSURE: 112 MMHG

## 2025-06-10 DIAGNOSIS — M79.602 LEFT ARM PAIN: ICD-10-CM

## 2025-06-10 DIAGNOSIS — R68.84 PAIN IN LOWER JAW: ICD-10-CM

## 2025-06-10 DIAGNOSIS — Z13.6 ENCOUNTER FOR SCREENING FOR CARDIOVASCULAR DISORDERS: Primary | ICD-10-CM

## 2025-06-10 DIAGNOSIS — Z82.49 FAMILY HISTORY OF AORTIC DISSECTION: ICD-10-CM

## 2025-06-10 PROCEDURE — 99999 PR PBB SHADOW E&M-EST. PATIENT-LVL III: CPT | Mod: PBBFAC,,, | Performed by: INTERNAL MEDICINE

## 2025-06-10 PROCEDURE — 1159F MED LIST DOCD IN RCRD: CPT | Mod: CPTII,S$GLB,, | Performed by: INTERNAL MEDICINE

## 2025-06-10 PROCEDURE — 3078F DIAST BP <80 MM HG: CPT | Mod: CPTII,S$GLB,, | Performed by: INTERNAL MEDICINE

## 2025-06-10 PROCEDURE — 99204 OFFICE O/P NEW MOD 45 MIN: CPT | Mod: S$GLB,,, | Performed by: INTERNAL MEDICINE

## 2025-06-10 PROCEDURE — 3074F SYST BP LT 130 MM HG: CPT | Mod: CPTII,S$GLB,, | Performed by: INTERNAL MEDICINE

## 2025-06-10 PROCEDURE — 3008F BODY MASS INDEX DOCD: CPT | Mod: CPTII,S$GLB,, | Performed by: INTERNAL MEDICINE

## 2025-06-10 RX ORDER — ESTRADIOL 0.1 MG/G
0.5 CREAM VAGINAL
COMMUNITY
Start: 2025-03-10 | End: 2026-03-10

## 2025-06-10 RX ORDER — CLOBETASOL PROPIONATE 0.5 MG/G
1 CREAM TOPICAL 2 TIMES DAILY
COMMUNITY
Start: 2025-03-07 | End: 2026-03-07

## 2025-06-10 NOTE — PROGRESS NOTES
Subjective:   @Patient ID:  Verito Perez is a 64 y.o. female who presents for evaluation of jaw pain    History of Present Illness             HPI:   History of Present Illness    CHIEF COMPLAINT:  Patient presents today for follow up after ED visit for jaw and LUE pain.    HISTORY OF PRESENT ILLNESS:  She presented to the ER 2 weeks ago with jaw pain accompanied by tingling and numbness in LUE. She experienced a painful neck lump that resolved after 2 days, with associated pain during eating and a slight headache. The LUE pain continues intermittently but has decreased in intensity. She denies chest pain or dyspnea.    FAMILY HISTORY:  Her mother had an aortic dissection at age 80 and  at age 81. Her son had cardiac issues as a baby. Her father had cardiac issues.    SOCIAL HISTORY:  She is a never smoker with occasional social alcohol use.         PMH:   Past Medical History:   Diagnosis Date    Acne     Celiac disease     Rosacea           Prior cardiovascular  Hx  --------------------------------      - EKG 2025 sinus rhythm without ischemic changes         The 10-year ASCVD risk score (Juan Ramon SEVILLA, et al., 2019) is: 2.6%    Values used to calculate the score:      Age: 64 years      Sex: Female      Is Non- : No      Diabetic: No      Tobacco smoker: No      Systolic Blood Pressure: 112 mmHg      Is BP treated: No      HDL Cholesterol: 54 mg/dL      Total Cholesterol: 150 mg/dL      Patient Active Problem List    Diagnosis Date Noted    Other chronic sinusitis 2018    Anxiety 2018    Rosacea 2018                    LAST HbA1c  Lab Results   Component Value Date    HGBA1C 5.0 2024       Lipid panel  Lab Results   Component Value Date    CHOL 150 2024    CHOL 153 2018     Lab Results   Component Value Date    HDL 54 2024    HDL 67 2018     Lab Results   Component Value Date    LDLCALC 75.2 2024    LDLCALC 69 2018      Lab Results   Component Value Date    TRIG 104 12/23/2024    TRIG 83 03/26/2018     Lab Results   Component Value Date    CHOLHDL 36.0 12/23/2024    CHOLHDL 2.3 03/26/2018            Review of Systems   Constitutional: Negative for chills and fever.   HENT:  Negative for hearing loss and nosebleeds.    Eyes:  Negative for blurred vision.   Cardiovascular:         As in HPI    Respiratory:  Negative for cough, hemoptysis and shortness of breath.    Endocrine: Negative for cold intolerance and polyuria.   Hematologic/Lymphatic: Negative for bleeding problem.   Skin:  Negative for itching.   Musculoskeletal:         As in HPI   Gastrointestinal:  Negative for abdominal pain and hematochezia.   Genitourinary:  Negative for hematuria.   Neurological:  Negative for dizziness and loss of balance.   Psychiatric/Behavioral:  Negative for altered mental status and depression.        Objective:   Physical Exam  Constitutional:       Appearance: She is well-developed.   HENT:      Head: Normocephalic and atraumatic.   Eyes:      Conjunctiva/sclera: Conjunctivae normal.   Neck:      Vascular: No carotid bruit or JVD.   Cardiovascular:      Rate and Rhythm: Normal rate and regular rhythm.      Pulses:           Carotid pulses are 2+ on the right side and 2+ on the left side.       Radial pulses are 2+ on the right side and 2+ on the left side.      Heart sounds: Normal heart sounds. No murmur heard.     No friction rub. No gallop.   Pulmonary:      Effort: Pulmonary effort is normal. No respiratory distress.      Breath sounds: Normal breath sounds. No stridor. No wheezing.   Abdominal:      General: Abdomen is flat.      Palpations: Abdomen is soft.   Musculoskeletal:      Cervical back: Neck supple.      Right lower leg: No edema.      Left lower leg: No edema.   Skin:     General: Skin is warm and dry.   Neurological:      Mental Status: She is alert and oriented to person, place, and time.   Psychiatric:         Behavior:  Behavior normal.         Assessment:     1. Encounter for screening for cardiovascular disorders    2. Left arm pain    3. Pain in lower jaw    4. Family history of aortic dissection        Plan:   Assessment & Plan             Assessment & Plan    M79.602 Left arm pain  R68.84 Pain in lower jaw  Z13.6 Encounter for screening for cardiovascular disorders  Z82.49 Family history of aortic dissection    IMPRESSION:  - Recent ER visit for jaw and LUE pain, with associated neck lump.  - Family history of aortic dissection (mother) and heart issues (father and son).  - Previous ER workup, including normal ECG and cardiac enzymes, reviewed.  - Further cardiac evaluation pursued due to atypical presentation and family history.      LEFT ARM PAIN:  - Discussed that cardiac symptoms can sometimes be atypical, warranting further investigation.    PAIN IN LOWER JAW:  - Discussed that cardiac symptoms can sometimes be atypical, warranting further investigation.    ENCOUNTER FOR SCREENING FOR CARDIOVASCULAR DISORDERS:  - Discussed that cardiac symptoms can sometimes be atypical, warranting further investigation.  - Ordered stress echocardiogram - discussed procedure including echocardiogram before and after treadmill exercise.  - Ordered CT Heart (calcium score) to evaluate coronary artery calcification as well as assess for ascending aorta sizes    - Follow up in 1 year if stress test and calcium score results are normal.    FAMILY HISTORY OF AORTIC DISSECTION:  - Ordered CT Heart (calcium score) to evaluate coronary artery calcification and visualize aorta.         Pertinent cardiac images and EKG reviewed independently.    Continue with current medical plan and lifestyle changes.  Return sooner for concerns or questions. If symptoms persist go to the ED  I have reviewed all pertinent data including patient's medical history in detail and updated the computerized patient record.     Orders Placed This Encounter   Procedures     CT Cardiac Scoring     Standing Status:   Future     Expected Date:   6/10/2025     Expiration Date:   6/10/2026     May the Radiologist modify the order per protocol to meet the clinical needs of the patient?:   Yes    Stress Echo Which stress agent will be used? Treadmill Exercise; Color Flow Doppler? Yes     Standing Status:   Future     Expiration Date:   6/10/2026     Which stress agent will be used?:   Treadmill Exercise     Color Flow Doppler?:   Yes     Release to patient:   Immediate       Follow up as scheduled.     -In today's visit, monitoring for drug toxicity was accomplished.     Greater than 50% of the visit was spent counseling, educating, and coordinating the care of the patient.     She expressed verbal understanding and agreed with the plan    Patient's Medications   New Prescriptions    No medications on file   Previous Medications    CITALOPRAM (CELEXA) 20 MG TABLET    TAKE 1 TABLET BY MOUTH EVERY DAY    CLOBETASOL (TEMOVATE) 0.05 % CREAM    1 application  2 (two) times daily.    ESTRADIOL (ESTRACE) 0.01 % (0.1 MG/GRAM) VAGINAL CREAM    Place 0.5 g vaginally.    FLUTICASONE (FLONASE) 50 MCG/ACTUATION NASAL SPRAY    2 sprays (100 mcg total) by Each Nare route once daily.    GABAPENTIN (NEURONTIN) 300 MG CAPSULE    TAKE 1 CAPSULE BY MOUTH EVERY DAY AT BEDTIME    LEVOCETIRIZINE (XYZAL) 5 MG TABLET    Take 1 tablet (5 mg total) by mouth every evening.   Modified Medications    No medications on file   Discontinued Medications    No medications on file        This note was generated with the assistance of ambient listening technology. Verbal consent was obtained by the patient and accompanying visitor(s) for the recording of patient appointment to facilitate this note. I attest to having reviewed and edited the generated note for accuracy, though some syntax or spelling errors may persist. Please contact the author of this note for any clarification.       Td Zazueta MD, FACC, RPVI  Heart and  Vascular Interventional Cardiology

## 2025-06-11 ENCOUNTER — HOSPITAL ENCOUNTER (OUTPATIENT)
Dept: RADIOLOGY | Facility: HOSPITAL | Age: 65
Discharge: HOME | End: 2025-06-11
Attending: INTERNAL MEDICINE
Payer: COMMERCIAL

## 2025-06-11 DIAGNOSIS — Z13.6 ENCOUNTER FOR SCREENING FOR CARDIOVASCULAR DISORDERS: ICD-10-CM

## 2025-06-11 DIAGNOSIS — Z82.49 FAMILY HISTORY OF AORTIC DISSECTION: ICD-10-CM

## 2025-06-11 DIAGNOSIS — R68.84 PAIN IN LOWER JAW: ICD-10-CM

## 2025-06-11 PROCEDURE — 75571 CT HRT W/O DYE W/CA TEST: CPT | Mod: TC

## 2025-06-16 ENCOUNTER — RESULTS FOLLOW-UP (OUTPATIENT)
Dept: CARDIOLOGY | Facility: CLINIC | Age: 65
End: 2025-06-16

## 2025-06-23 NOTE — PROGRESS NOTES
PATIENT: Verito Perez  MRN: [id number]  DATE: 6/24/2025    Scribe Attestation: I, Maritza Flores, am scribing under the direction and in the presence of Alma Delia Asif MD. I attest that this documentation is true, accurate and complete to the best of my knowledge.     Diagnosis:   1. Lytic lesion of bone on x-ray    2. Other specified counseling    3. Thrombocytopenia    4. Advanced directives, counseling/discussion    5. Chronic anxiety    6. Exercise counseling    7. Alternating constipation and diarrhea      Chief Complaint: Lytic lesion on bone x-ray    Oncologic History:      Oncologic History     Oncologic Treatment     Pathology       Subjective:    History of Present Illness: Ms. Perez is a 64 y.o. female who presents for evaluation and management of abnormal CT. Patient presented to ED on 06/01/2025 with complaints of intermittent pain and paresthesias to the LUE that had begun a few days prior. CT Cervical spine was completed which showed incidental findings of multiple scattered subcentimeter lytic foci throughout the calvarium, cervical spine, and imaged upper thoracic spine. Pain was controlled and patient was discharged home with a referral to me.    Today, patient endorses night sweats and diaphoresis that began 10 years ago that she attributes to menopause. Denies current pain. She also endorses tingling and numbness to LLE.    Patient does not exercise.    Patient has regular mammograms and colonoscopies. No abnormalities noted per patient.    No abnormal lumps, bumps, or abnormal masses palpated. Pt has no fever, chills, and rigors. Appetite is good. Good energy levels. Weight stable. Pt denies headache, confusion, or Lhermitte symptomatology. No C/T/L spine or other back pain. Denies blurry vision. No changes in urinary habits. Pt has no cough or labored breathing. Denies palpitation, chest pain, anginal or pleuritic, or leg swelling. Denies history of blood clots or being on  anticoagulants.    Of note, patient works as a teacher. Her  is present with her in clinic today.    Past medical, surgical, family, and social histories have been reviewed and updated below.    Past Medical History:   Past Medical History:   Diagnosis Date    Acne     Celiac disease     Rosacea      Past Surgical History:   Past Surgical History:   Procedure Laterality Date    CAUTERY OF TURBINATES N/A 6/18/2018    Procedure: CAUTERIZATION OF TURBINATES;  Surgeon: Yuliya Hawkins MD;  Location: [facility] OR;  Service: ENT;  Laterality: N/A;    COLONOSCOPY  4/4/13    COLONOSCOPY N/A 6/8/2023    Procedure: COLONOSCOPY;  Surgeon: Fox Marquez MD;  Location: [facility] ENDO;  Service: Endoscopy;  Laterality: N/A;    FUNCTIONAL ENDOSCOPIC SINUS SURGERY (FESS) USING COMPUTER-ASSISTED NAVIGATION N/A 6/18/2018    Procedure: SINUS SURGERY FUNCTIONAL ENDOSCOPIC WITH NAVIGATION;  Surgeon: Yuliya Hawkins MD;  Location: [facility] OR;  Service: ENT;  Laterality: N/A;  Beau notified (he's out of town, not available) video    HYSTERECTOMY      NASAL SEPTOPLASTY N/A 6/18/2018    Procedure: SEPTOPLASTY;  Surgeon: Yuliya Hawkins MD;  Location: [facility] OR;  Service: ENT;  Laterality: N/A;    TONSILLECTOMY       Family History:   Family History   Problem Relation Name Age of Onset    Heart disease Mother      Stroke Mother      Alcohol abuse Father Tanner     Diabetes Father Tanner     Heart disease Son Jorden         tetraology of fallot    Cancer Maternal Grandmother Chary         thyroid     Social History:  reports that she has never smoked. She has never been exposed to tobacco smoke. She has never used smokeless tobacco. She reports current alcohol use of about 2.0 standard drinks of alcohol per week. She reports that she does not use drugs.    Allergies:  Review of patient's allergies indicates:   Allergen Reactions    Sulfa (sulfonamide antibiotics)      Medications:  Current  "Medications[1]    Review of Systems  Comprehensive ROS is negative except for what was mentioned in HPI.     ECOG Performance Status:   ECOG SCORE           Objective:      Vitals:   Vitals:    06/24/25 1024   BP: 109/72   Pulse: 91   Temp: 96.9 °F (36.1 °C)   SpO2: 97%   Weight: 74.1 kg (163 lb 5.8 oz)   Height: 5' 6" (1.676 m)     BMI: Body mass index is 26.37 kg/m².    Physical Exam  Vitals and nursing note reviewed.   Constitutional:       General: She is not in acute distress.     Appearance: She is not toxic-appearing or diaphoretic.      Comments: APPEARS ENERGETIC/NORMAL ENERGY   HENT:      Head: Normocephalic and atraumatic.      Right Ear: External ear normal.      Left Ear: External ear normal.      Nose: Nose normal. No congestion or rhinorrhea.      Mouth/Throat:      Mouth: Mucous membranes are moist.      Pharynx: No oropharyngeal exudate or posterior oropharyngeal erythema.      Comments: NO HALITOSIS  Eyes:      General: Lids are normal. Vision grossly intact.      Extraocular Movements: Extraocular movements intact.      Right eye: No nystagmus.      Left eye: No nystagmus.      Conjunctiva/sclera: Conjunctivae normal.      Pupils: Pupils are equal, round, and reactive to light.   Cardiovascular:      Rate and Rhythm: Normal rate and regular rhythm.      Pulses: Normal pulses.      Heart sounds: Normal heart sounds.   Pulmonary:      Effort: Pulmonary effort is normal. No respiratory distress.      Breath sounds: Normal breath sounds. No stridor. No wheezing, rhonchi or rales.      Comments: NO PLEURITIC CHEST PAIN  Chest:      Chest wall: No tenderness.   Abdominal:      General: Bowel sounds are normal. There is no distension.      Palpations: Abdomen is soft. There is no fluid wave, hepatomegaly, splenomegaly or mass.      Tenderness: There is no abdominal tenderness. There is no right CVA tenderness, left CVA tenderness, guarding or rebound.      Comments: NO ORGANOMEGALY   Musculoskeletal:    "      General: No swelling or tenderness. Normal range of motion.      Cervical back: Normal range of motion and neck supple. No rigidity.      Right lower leg: No edema.      Left lower leg: No edema.      Comments: NO CORDS PALPABLE, NEGATIVE HOMANS   Lymphadenopathy:      Head:      Right side of head: No submental, submandibular, tonsillar, preauricular, posterior auricular or occipital adenopathy.      Left side of head: No submental, submandibular, tonsillar, preauricular, posterior auricular or occipital adenopathy.      Cervical: No cervical adenopathy.      Right cervical: No superficial, deep or posterior cervical adenopathy.     Left cervical: No superficial, deep or posterior cervical adenopathy.      Upper Body:      Right upper body: No supraclavicular, axillary or epitrochlear adenopathy.      Left upper body: No supraclavicular, axillary or epitrochlear adenopathy.      Lower Body: No right inguinal adenopathy. No left inguinal adenopathy.      Comments: All mamie basins are nontender   Skin:     General: Skin is warm and dry.      Coloration: Skin is not ashen, cyanotic, jaundiced, mottled or pale.      Findings: No bruising, erythema or rash.      Comments: -NO PETECHIAE  -NO OBVIOUS LESIONS BUT THOROUGH EXAM NOT CARRIED OUT   Neurological:      General: No focal deficit present.      Mental Status: She is alert and oriented to person, place, and time.      Motor: No weakness.      Gait: Gait normal.      Deep Tendon Reflexes: Reflexes normal.      Reflex Scores:       Patellar reflexes are 2+ on the right side and 2+ on the left side.     Comments: NO CLONUS  ABSENT LHERMITTE'S  2++ patellar reflexes   Psychiatric:         Mood and Affect: Mood normal.         Behavior: Behavior normal. Behavior is cooperative.         Judgment: Judgment normal.      Comments:  great support system, mutual       Laboratory Data:  Labs have been reviewed.    Lab Results   Component Value Date    WBC 5.61  "06/01/2025    RBC 5.07 06/01/2025    HGB 14.6 06/01/2025    HCT 45.0 06/01/2025    MCV 89 06/01/2025    MCH 28.8 06/01/2025    MCHC 32.4 06/01/2025    RDW 12.8 06/01/2025     (L) 06/01/2025    MPV 12.9 06/01/2025    GRAN 4.4 12/23/2024    GRAN 63.4 12/23/2024    LYMPH 23.5 06/01/2025    LYMPH 1.32 06/01/2025    MONO 7.8 06/01/2025    MONO 0.44 06/01/2025    EOS 6.4 06/01/2025    EOS 0.36 06/01/2025    BASO 0.04 12/23/2024    EOSINOPHIL 4.5 12/23/2024    BASOPHIL 0.7 06/01/2025    BASOPHIL 0.04 06/01/2025     No results found for: "UIBC", "IRON", "TRANS", "TRANSFERRIN", "TIBC", "LABIRON", "FESATURATED"   No results found for: "FERRITIN"  CMP  Sodium   Date Value Ref Range Status   06/01/2025 139 136 - 145 mmol/L Final   12/23/2024 138 136 - 145 mmol/L Final     Potassium   Date Value Ref Range Status   06/01/2025 4.8 3.5 - 5.1 mmol/L Final   12/23/2024 4.2 3.5 - 5.1 mmol/L Final     Chloride   Date Value Ref Range Status   06/01/2025 105 95 - 110 mmol/L Final   12/23/2024 101 95 - 110 mmol/L Final     CO2   Date Value Ref Range Status   06/01/2025 25 23 - 29 mmol/L Final   12/23/2024 31 (H) 23 - 29 mmol/L Final     Glucose   Date Value Ref Range Status   06/01/2025 84 70 - 110 mg/dL Final   12/23/2024 108 70 - 110 mg/dL Final     BUN   Date Value Ref Range Status   06/01/2025 23 8 - 23 mg/dL Final     Creatinine   Date Value Ref Range Status   06/01/2025 0.8 0.5 - 1.4 mg/dL Final     Calcium   Date Value Ref Range Status   06/01/2025 9.4 8.7 - 10.5 mg/dL Final   12/23/2024 9.2 8.7 - 10.5 mg/dL Final     Protein Total   Date Value Ref Range Status   06/01/2025 7.9 6.0 - 8.4 gm/dL Final     Total Protein   Date Value Ref Range Status   12/23/2024 7.4 6.0 - 8.4 g/dL Final     Albumin   Date Value Ref Range Status   06/01/2025 3.9 3.5 - 5.2 g/dL Final   12/23/2024 4.2 3.5 - 5.2 g/dL Final     Total Bilirubin   Date Value Ref Range Status   12/23/2024 0.4 0.1 - 1.0 mg/dL Final     Comment:     For infants and " newborns, interpretation of results should be based  on gestational age, weight and in agreement with clinical  observations.    Premature Infant recommended reference ranges:  Up to 24 hours.............<8.0 mg/dL  Up to 48 hours............<12.0 mg/dL  3-5 days..................<15.0 mg/dL  6-29 days.................<15.0 mg/dL       Bilirubin Total   Date Value Ref Range Status   06/01/2025 0.3 0.1 - 1.0 mg/dL Final     Comment:     For infants and newborns, interpretation of results should be based   on gestational age, weight and in agreement with clinical   observations.    Premature Infant recommended reference ranges:   0-24 hours:  <8.0 mg/dL   24-48 hours: <12.0 mg/dL   3-5 days:    <15.0 mg/dL   6-29 days:   <15.0 mg/dL     Alkaline Phosphatase   Date Value Ref Range Status   12/23/2024 72 38 - 126 U/L Final     ALP   Date Value Ref Range Status   06/01/2025 90 40 - 150 unit/L Final     AST   Date Value Ref Range Status   06/01/2025 27 11 - 45 unit/L Final   12/23/2024 27 15 - 46 U/L Final     ALT   Date Value Ref Range Status   06/01/2025 15 10 - 44 unit/L Final   12/23/2024 17 10 - 44 U/L Final     Anion Gap   Date Value Ref Range Status   06/01/2025 9 8 - 16 mmol/L Final     eGFR   Date Value Ref Range Status   06/01/2025 >60 >60 mL/min/1.73/m2 Final     Comment:     Estimated GFR calculated using the CKD-EPI creatinine (2021) equation.   12/23/2024 54.4 (A) >60 mL/min/1.73 m^2 Final     Pathology:   None today    Imaging:    Mammo Digital Screening Bilat w/ [place] on 02/10/2025  Impression:  Bilateral  There is no mammographic evidence of malignancy.     BI-RADS Category:   Overall: 2 - Benign    Recommendation:  Routine screening mammogram in 1 year is recommended.    CT Head/Cervical Spine Without Contrast on 06/01/2025  Impression:  1. No acute intracranial abnormality.  2. No CT evidence of cervical spine acute osseous traumatic injury.  3. Minimal to mild cervical spondylosis most prominent at C4-5  level.  4. Multiple scattered subcentimeter lytic foci throughout the calvarium, cervical spine and imaged upper thoracic spine.  Findings are nonspecific but can be seen with lytic metastases or multiple myeloma.  Further evaluation with elective/nonemergent bone scan can be obtained as warranted.    Assessment:       1. Lytic lesion of bone on x-ray    2. Other specified counseling    3. Thrombocytopenia    4. Advanced directives, counseling/discussion    5. Chronic anxiety    6. Exercise counseling    7. Alternating constipation and diarrhea      Lytic lesion of bone on x-ray:  - Incidental finding on recent CT Head/Cervical Spine on 06/01/2025 rather diffuse  - Discussed that findings could be indicative of multiple myeloma. Counseled on differences between multiple myeloma vs smoldering myeloma vs MGUS. Discussed pathogenesis of multiple myeloma. Explained that this is a slow growing disease and often people can go several years without needing treatment. Explained that imaging might be needed after lab work up. We will consider a PET Scan vs a Bone scan. Counseled on differences between these types of imaging. Discussed possible need for bone marrow biopsy. Patient and  acknowledged understanding.  - Ordered urine random immunofixation, immunoglobulin free LT Chains blood, urine immunoglobulin TLC, immunoglobulins quantitative, LDH, and serum protein electrophoresis to be collected today    Thrombocytopenia, mild:  - Not clinically significant  - Otherwise CBC normal  --no h/o bleeding    Alternating Constipation and Diarrhea:  - Patient has chronic GI issues. She mostly has constipation. She follows with GI. Recommended regular exercise as mobility improves constipation. See below.    Exercise Counseling:  - Counseled on cardio exercise 30 min/6 days a week as an anti-cancer, anti-diabetes, anti-stroke, mood lifting, etc. May start with a few minutes per day and build up. Also counseled on  weight-bearing and muscle-strengthening exercises to maintain muscle mass. Both types of exercise are life-prolonging and improve quality of life.   - Discussed reaching target heart rate (HR) for cardio exercise. To find your target cardio heart rate, you'll need to first estimate your maximum heart rate, which is generally calculated as 220 minus your age. Then, your target heart rate range for moderate intensity exercise is typically 50% to 70% of your maximum heart rate, and for vigorous intensity, it's 70% to 85%.   - First, find your estimated maximum HR (EMHR):  220 - your age = EMHR  - Next, multiply your estimated maximum heart rate by 50% and 70% for moderate intensity and 70% and 85% for vigorous intensity.    EMHR x 0.50 = Losest HR for moderate intensity workout  EMHR x 0.70 = Highest HR for moderate intensity workout    EMHR x 0.70 = Lowest HR for vigorous intensity workout  EMHR x 0.85 = Highest HR for vigorous intensity workout       Plan:     Today I counseled pt about the following:  DIET AND EXERCISE, TODAYS PLANS: FOLLOW UP, REFERRALS, TREATMENT DETAILS, LIVING WILL, and ADVANCED DIRECTIVES  Patient and  exhibit understanding of all counseling today.    RTC in 3 weeks to review labs    I spent a total of 30 minutes in a combination of focused physical exam and history, reviewing outside records, reviewing any available radiographs, counseling, communicating with other health care professionals regarding this patients medical condition, independently interpreting results, developing diagnostic and treatment plan and documenting in the EMR. Much time spent on differential diagnosis, pt desire for detailed info about myeloma or other possibilities of abnormal radiographs, sold tumor vs myeloma--ended up in detailed conv about myeloma, new tx, new tx if intermediate smoldering etc.  and she asked  questions--answered with exhibitied understanding and thx--affirmed frustration with time  contraints of getting work-up back--reiterated myeloma is a long term dosirder and if present not an acute/right now tx problem. Anxiety went down a bit the more the education was carried out--asked pt and  to call if have forgotten questions or concerns. Note lab orders entgered by me etc    Participating Clinicians:  PCP-see story board    Disclaimer: This note was prepared using voice recognition system and is likely to have sound alike errors.  Please interpret accordingly.    Alma Delia Asif M.D.  Hematology/Oncology  [facility] - [address], [address]  [place] [address]  Phone: [phone]  Fax: [phone]         [1]   Current Outpatient Medications   Medication Sig Dispense Refill    citalopram (CELEXA) 20 MG tablet TAKE 1 TABLET BY MOUTH EVERY DAY 90 tablet 3    clobetasoL (TEMOVATE) 0.05 % cream 1 application  2 (two) times daily.      estradioL (ESTRACE) 0.01 % (0.1 mg/gram) vaginal cream Place 0.5 g vaginally.      fluticasone (FLONASE) 50 mcg/actuation nasal spray 2 sprays (100 mcg total) by Each Nare route once daily. 1 Bottle 12    gabapentin (NEURONTIN) 300 MG capsule TAKE 1 CAPSULE BY MOUTH EVERY DAY AT BEDTIME 90 capsule 3    levocetirizine (XYZAL) 5 MG tablet Take 1 tablet (5 mg total) by mouth every evening. 30 tablet 11     No current facility-administered medications for this visit.

## 2025-06-24 ENCOUNTER — LAB VISIT (OUTPATIENT)
Dept: LAB | Facility: HOSPITAL | Age: 65
End: 2025-06-24
Attending: INTERNAL MEDICINE
Payer: COMMERCIAL

## 2025-06-24 ENCOUNTER — OFFICE VISIT (OUTPATIENT)
Dept: HEMATOLOGY/ONCOLOGY | Facility: CLINIC | Age: 65
End: 2025-06-24
Payer: COMMERCIAL

## 2025-06-24 VITALS
OXYGEN SATURATION: 97 % | DIASTOLIC BLOOD PRESSURE: 72 MMHG | HEART RATE: 91 BPM | SYSTOLIC BLOOD PRESSURE: 109 MMHG | WEIGHT: 163.38 LBS | BODY MASS INDEX: 26.26 KG/M2 | HEIGHT: 66 IN | TEMPERATURE: 97 F

## 2025-06-24 DIAGNOSIS — M89.8X9 LYTIC LESION OF BONE ON X-RAY: Primary | ICD-10-CM

## 2025-06-24 DIAGNOSIS — F41.9 CHRONIC ANXIETY: ICD-10-CM

## 2025-06-24 DIAGNOSIS — Z71.89 ADVANCED DIRECTIVES, COUNSELING/DISCUSSION: ICD-10-CM

## 2025-06-24 DIAGNOSIS — R19.8 ALTERNATING CONSTIPATION AND DIARRHEA: ICD-10-CM

## 2025-06-24 DIAGNOSIS — Z71.82 EXERCISE COUNSELING: ICD-10-CM

## 2025-06-24 DIAGNOSIS — D69.6 THROMBOCYTOPENIA: ICD-10-CM

## 2025-06-24 DIAGNOSIS — Z71.89 OTHER SPECIFIED COUNSELING: ICD-10-CM

## 2025-06-24 DIAGNOSIS — M89.8X9 LYTIC LESION OF BONE ON X-RAY: ICD-10-CM

## 2025-06-24 LAB
ABSOLUTE EOSINOPHIL (OHS): 0.23 K/UL
ABSOLUTE MONOCYTE (OHS): 0.47 K/UL (ref 0.3–1)
ABSOLUTE NEUTROPHIL COUNT (OHS): 4.68 K/UL (ref 1.8–7.7)
ALBUMIN SERPL BCP-MCNC: 3.9 G/DL (ref 3.5–5.2)
ALP SERPL-CCNC: 92 UNIT/L (ref 40–150)
ALT SERPL W/O P-5'-P-CCNC: 14 UNIT/L (ref 10–44)
ANION GAP (OHS): 6 MMOL/L (ref 8–16)
AST SERPL-CCNC: 21 UNIT/L (ref 11–45)
BASOPHILS # BLD AUTO: 0.04 K/UL
BASOPHILS NFR BLD AUTO: 0.6 %
BILIRUB SERPL-MCNC: 0.4 MG/DL (ref 0.1–1)
BUN SERPL-MCNC: 19 MG/DL (ref 8–23)
CALCIUM SERPL-MCNC: 10 MG/DL (ref 8.7–10.5)
CHLORIDE SERPL-SCNC: 104 MMOL/L (ref 95–110)
CO2 SERPL-SCNC: 30 MMOL/L (ref 23–29)
CREAT SERPL-MCNC: 0.8 MG/DL (ref 0.5–1.4)
ERYTHROCYTE [DISTWIDTH] IN BLOOD BY AUTOMATED COUNT: 12.8 % (ref 11.5–14.5)
GFR SERPLBLD CREATININE-BSD FMLA CKD-EPI: >60 ML/MIN/1.73/M2
GLUCOSE SERPL-MCNC: 81 MG/DL (ref 70–110)
HCT VFR BLD AUTO: 45.9 % (ref 37–48.5)
HGB BLD-MCNC: 14.5 GM/DL (ref 12–16)
IGA SERPL-MCNC: 366 MG/DL (ref 40–350)
IGG SERPL-MCNC: 1230 MG/DL (ref 650–1600)
IGM SERPL-MCNC: 121 MG/DL (ref 50–300)
IMM GRANULOCYTES # BLD AUTO: 0.02 K/UL (ref 0–0.04)
IMM GRANULOCYTES NFR BLD AUTO: 0.3 % (ref 0–0.5)
LDH SERPL-CCNC: 204 U/L (ref 110–260)
LYMPHOCYTES # BLD AUTO: 1.08 K/UL (ref 1–4.8)
MCH RBC QN AUTO: 28.7 PG (ref 27–31)
MCHC RBC AUTO-ENTMCNC: 31.6 G/DL (ref 32–36)
MCV RBC AUTO: 91 FL (ref 82–98)
NUCLEATED RBC (/100WBC) (OHS): 0 /100 WBC
PLATELET # BLD AUTO: 166 K/UL (ref 150–450)
PMV BLD AUTO: 12.8 FL (ref 9.2–12.9)
POTASSIUM SERPL-SCNC: 4.9 MMOL/L (ref 3.5–5.1)
PROT SERPL-MCNC: 7.4 GM/DL (ref 6–8.4)
RBC # BLD AUTO: 5.06 M/UL (ref 4–5.4)
RELATIVE EOSINOPHIL (OHS): 3.5 %
RELATIVE LYMPHOCYTE (OHS): 16.6 % (ref 18–48)
RELATIVE MONOCYTE (OHS): 7.2 % (ref 4–15)
RELATIVE NEUTROPHIL (OHS): 71.8 % (ref 38–73)
SODIUM SERPL-SCNC: 140 MMOL/L (ref 136–145)
WBC # BLD AUTO: 6.52 K/UL (ref 3.9–12.7)

## 2025-06-24 PROCEDURE — 36415 COLL VENOUS BLD VENIPUNCTURE: CPT

## 2025-06-24 PROCEDURE — 84165 PROTEIN E-PHORESIS SERUM: CPT

## 2025-06-24 PROCEDURE — 86335 IMMUNFIX E-PHORSIS/URINE/CSF: CPT

## 2025-06-24 PROCEDURE — 85025 COMPLETE CBC W/AUTO DIFF WBC: CPT

## 2025-06-24 PROCEDURE — 99203 OFFICE O/P NEW LOW 30 MIN: CPT | Mod: S$GLB,,, | Performed by: INTERNAL MEDICINE

## 2025-06-24 PROCEDURE — 83883 ASSAY NEPHELOMETRY NOT SPEC: CPT | Mod: 59

## 2025-06-24 PROCEDURE — 3078F DIAST BP <80 MM HG: CPT | Mod: CPTII,S$GLB,, | Performed by: INTERNAL MEDICINE

## 2025-06-24 PROCEDURE — 86335 IMMUNFIX E-PHORSIS/URINE/CSF: CPT | Mod: 26,,, | Performed by: PATHOLOGY

## 2025-06-24 PROCEDURE — 99999 PR PBB SHADOW E&M-EST. PATIENT-LVL IV: CPT | Mod: PBBFAC,,, | Performed by: INTERNAL MEDICINE

## 2025-06-24 PROCEDURE — 83521 IG LIGHT CHAINS FREE EACH: CPT

## 2025-06-24 PROCEDURE — 3074F SYST BP LT 130 MM HG: CPT | Mod: CPTII,S$GLB,, | Performed by: INTERNAL MEDICINE

## 2025-06-24 PROCEDURE — 84165 PROTEIN E-PHORESIS SERUM: CPT | Mod: 26,,, | Performed by: PATHOLOGY

## 2025-06-24 PROCEDURE — 3008F BODY MASS INDEX DOCD: CPT | Mod: CPTII,S$GLB,, | Performed by: INTERNAL MEDICINE

## 2025-06-24 PROCEDURE — 82784 ASSAY IGA/IGD/IGG/IGM EACH: CPT

## 2025-06-24 PROCEDURE — 1159F MED LIST DOCD IN RCRD: CPT | Mod: CPTII,S$GLB,, | Performed by: INTERNAL MEDICINE

## 2025-06-24 PROCEDURE — 83615 LACTATE (LD) (LDH) ENZYME: CPT

## 2025-06-24 PROCEDURE — 1160F RVW MEDS BY RX/DR IN RCRD: CPT | Mod: CPTII,S$GLB,, | Performed by: INTERNAL MEDICINE

## 2025-06-24 PROCEDURE — 82040 ASSAY OF SERUM ALBUMIN: CPT

## 2025-06-25 LAB
ALBUMIN, SPE (OHS): 4.04 G/DL (ref 3.35–5.55)
ALPHA 1 GLOB (OHS): 0.27 GM/DL (ref 0.17–0.41)
ALPHA 2 GLOB (OHS): 0.7 GM/DL (ref 0.43–0.99)
BETA GLOB (OHS): 0.95 GM/DL (ref 0.5–1.1)
GAMMA GLOBULIN (OHS): 1.24 GM/DL (ref 0.67–1.58)
KAPPA LC FREE SER-MCNC: 1.52 MG/L (ref 0.26–1.65)
KAPPA LC FREE/LAMBDA FREE SER: 3.49 MG/DL (ref 0.33–1.94)
LAMBDA LC FREE SERPL-MCNC: 2.3 MG/DL (ref 0.57–2.63)
PATHOLOGIST REVIEW - SPE (OHS): NORMAL
PROT SERPL-MCNC: 7.2 GM/DL (ref 6–8.4)

## 2025-06-26 LAB
KAPPA LC UR-MCNC: <0.9 MG/DL
KAPPA LC/LAMBDA UR: NORMAL {RATIO} (ref 0.7–6.2)
LAMBDA LC UR-MCNC: <0.7 MG/DL
PATHOLOGIST INTERPRETATION - IFE URINE (OHS): NORMAL
PROT UR-MCNC: <7 MG/DL

## 2025-07-14 NOTE — PROGRESS NOTES
PATIENT: Verito Perez  MRN: [id number]  DATE: 7/15/2025    Scribe Attestation: I, Maritza Flores, am scribing under the direction and in the presence of Alma Delia Asif MD. I attest that this documentation is true, accurate and complete to the best of my knowledge.     Diagnosis:   1. Plasma protein disorder    2. Abnormal RBC indices    3. Lytic lesion of bone on x-ray    4. Exercise counseling    5. Advanced directives, counseling/discussion    6. Other specified counseling    7. Thrombocytopenia    8. Chronic anxiety    9. Alternating constipation and diarrhea        Chief Complaint: Lytic lesion on bone x-ray    Oncologic History:      Oncologic History     Oncologic Treatment     Pathology       Subjective:    History of Present Illness: Ms. Perez is a 64 y.o. female who presents for follow up evaluation and management of abnormal CT. Patient presented to ED on 06/01/2025 with complaints of intermittent pain and paresthesias to the LUE that had begun a few days prior. CT Cervical spine was completed which showed incidental findings of multiple scattered subcentimeter lytic foci throughout the calvarium, cervical spine, and imaged upper thoracic spine. Pain was controlled and patient was discharged home with a referral to me.    The patient arrived today for a PPT follow-up assessment. She claims to have slight headaches when she wakes up, which she believes are brought on by her frequent teeth grinding.    Patient reports that although she has put on weight, she now walks with her .    Patient note that she snores when she sleeps and that she had a sleep study schedule. However, due to inclement weather, the session had to be canceled, and she never followed up to reschedule.     FMHx step father has sleep apnea    No abnormal lumps, bumps, or abnormal masses palpated. Pt has no fever, chills, and rigors. Appetite is good. Good energy levels. Weight stable. Pt denies headache, confusion, or Lhermitte  symptomatology. No C/T/L spine or other back pain. Denies blurry vision. No changes in urinary habits. Pt has no cough or labored breathing. Denies palpitation, chest pain, anginal or pleuritic, or leg swelling. Denies history of blood clots or being on anticoagulants.    Of note, patient works as a teacher. Her  is present with her in clinic today.    Past medical, surgical, family, and social histories have been reviewed and updated below.    Past Medical History:   Past Medical History:   Diagnosis Date    Acne     Celiac disease     Rosacea      Past Surgical History:   Past Surgical History:   Procedure Laterality Date    CAUTERY OF TURBINATES N/A 6/18/2018    Procedure: CAUTERIZATION OF TURBINATES;  Surgeon: Yuliya Hawkins MD;  Location: [facility] OR;  Service: ENT;  Laterality: N/A;    COLONOSCOPY  4/4/13    COLONOSCOPY N/A 6/8/2023    Procedure: COLONOSCOPY;  Surgeon: Fox Marquez MD;  Location: [facility];  Service: Endoscopy;  Laterality: N/A;    FUNCTIONAL ENDOSCOPIC SINUS SURGERY (FESS) USING COMPUTER-ASSISTED NAVIGATION N/A 6/18/2018    Procedure: SINUS SURGERY FUNCTIONAL ENDOSCOPIC WITH NAVIGATION;  Surgeon: Yuliya Hawkins MD;  Location: [facility] OR;  Service: ENT;  Laterality: N/A;  Beau notified (he's out of town, not available) video    HYSTERECTOMY      NASAL SEPTOPLASTY N/A 6/18/2018    Procedure: SEPTOPLASTY;  Surgeon: Yuliya Hawkins MD;  Location: [facility] OR;  Service: ENT;  Laterality: N/A;    TONSILLECTOMY       Family History:   Family History   Problem Relation Name Age of Onset    Heart disease Mother      Stroke Mother      Alcohol abuse Father Tanner     Diabetes Father Tanner     Heart disease Son Jorden         tetraology of fallot    Cancer Maternal Grandmother Chary         thyroid     Social History:  reports that she has never smoked. She has never been exposed to tobacco smoke. She has never used smokeless tobacco. She reports current  alcohol use of about 2.0 standard drinks of alcohol per week. She reports that she does not use drugs.    Allergies:  Review of patient's allergies indicates:   Allergen Reactions    Sulfa (sulfonamide antibiotics)      Medications:  Current Medications[1]    Review of Systems  Comprehensive ROS is negative except for what was mentioned in HPI.     ECOG Performance Status:   ECOG SCORE           Objective:      Vitals:   Vitals:    07/15/25 0921   BP: 119/75   Pulse: 72   Temp: 97.5 °F (36.4 °C)   SpO2: 99%   Weight: 74.9 kg (165 lb 2 oz)       BMI: Body mass index is 26.65 kg/m².    Physical Exam  Vitals and nursing note reviewed.   Constitutional:       General: She is not in acute distress.     Appearance: She is not toxic-appearing or diaphoretic.      Comments: APPEARS ENERGETIC/NORMAL ENERGY   HENT:      Head: Normocephalic and atraumatic.      Right Ear: External ear normal.      Left Ear: External ear normal.      Nose: Nose normal. No congestion or rhinorrhea.      Mouth/Throat:      Mouth: Mucous membranes are moist.      Pharynx: No oropharyngeal exudate or posterior oropharyngeal erythema.      Comments: NO HALITOSIS  Eyes:      General: Lids are normal. Vision grossly intact.      Extraocular Movements: Extraocular movements intact.      Conjunctiva/sclera: Conjunctivae normal.      Pupils: Pupils are equal, round, and reactive to light.   Cardiovascular:      Rate and Rhythm: Normal rate and regular rhythm.      Pulses: Normal pulses.      Heart sounds: Normal heart sounds.   Pulmonary:      Effort: Pulmonary effort is normal. No respiratory distress.      Breath sounds: Normal breath sounds. No stridor. No wheezing, rhonchi or rales.      Comments: NO PLEURITIC CHEST PAIN  Chest:      Chest wall: No tenderness.   Abdominal:      General: Bowel sounds are normal. There is no distension.      Palpations: Abdomen is soft. There is no fluid wave, hepatomegaly, splenomegaly or mass.      Tenderness: There  is no abdominal tenderness. There is no right CVA tenderness, left CVA tenderness, guarding or rebound.      Comments: NO ORGANOMEGALY   Musculoskeletal:         General: No swelling or tenderness. Normal range of motion.      Cervical back: Normal range of motion and neck supple. No rigidity.      Right lower leg: No edema.      Left lower leg: No edema.      Comments: NO CORDS PALPABLE, NEGATIVE HOMANS    Skull intact and NT   Lymphadenopathy:      Head:      Right side of head: No submental, submandibular, tonsillar, preauricular, posterior auricular or occipital adenopathy.      Left side of head: No submental, submandibular, tonsillar, preauricular, posterior auricular or occipital adenopathy.      Cervical: No cervical adenopathy.      Right cervical: No superficial, deep or posterior cervical adenopathy.     Left cervical: No superficial, deep or posterior cervical adenopathy.      Upper Body:      Right upper body: No supraclavicular adenopathy.      Left upper body: No supraclavicular adenopathy.      Comments: All mamie basins are nontender   Skin:     General: Skin is warm and dry.      Coloration: Skin is not ashen, cyanotic, jaundiced, mottled or pale.      Findings: No bruising, erythema or rash.      Comments: -NO PETECHIAE  -NO OBVIOUS LESIONS BUT THOROUGH EXAM NOT CARRIED OUT   Neurological:      General: No focal deficit present.      Mental Status: She is alert and oriented to person, place, and time.      Motor: No weakness.      Gait: Gait normal.      Deep Tendon Reflexes: Reflexes normal.      Comments: NO CLONUS  ABSENT LHERMITTE'S   Psychiatric:         Mood and Affect: Mood normal.         Behavior: Behavior normal. Behavior is cooperative.         Judgment: Judgment normal.      Comments:  great support system, mutual       Laboratory Data:  Labs have been reviewed.    Lab Results   Component Value Date    WBC 6.52 06/24/2025    RBC 5.06 06/24/2025    HGB 14.5 06/24/2025    HCT 45.9  "06/24/2025    MCV 91 06/24/2025    MCH 28.7 06/24/2025    MCHC 31.6 (L) 06/24/2025    RDW 12.8 06/24/2025     06/24/2025    MPV 12.8 06/24/2025    GRAN 4.4 12/23/2024    GRAN 63.4 12/23/2024    LYMPH 16.6 (L) 06/24/2025    LYMPH 1.08 06/24/2025    MONO 7.2 06/24/2025    MONO 0.47 06/24/2025    EOS 3.5 06/24/2025    EOS 0.23 06/24/2025    BASO 0.04 12/23/2024    EOSINOPHIL 4.5 12/23/2024    BASOPHIL 0.6 06/24/2025    BASOPHIL 0.04 06/24/2025     No results found for: "UIBC", "IRON", "TRANS", "TRANSFERRIN", "TIBC", "LABIRON", "FESATURATED"   No results found for: "FERRITIN"  CMP  Sodium   Date Value Ref Range Status   06/24/2025 140 136 - 145 mmol/L Final   12/23/2024 138 136 - 145 mmol/L Final     Potassium   Date Value Ref Range Status   06/24/2025 4.9 3.5 - 5.1 mmol/L Final   12/23/2024 4.2 3.5 - 5.1 mmol/L Final     Chloride   Date Value Ref Range Status   06/24/2025 104 95 - 110 mmol/L Final   12/23/2024 101 95 - 110 mmol/L Final     CO2   Date Value Ref Range Status   06/24/2025 30 (H) 23 - 29 mmol/L Final   12/23/2024 31 (H) 23 - 29 mmol/L Final     Glucose   Date Value Ref Range Status   06/24/2025 81 70 - 110 mg/dL Final   12/23/2024 108 70 - 110 mg/dL Final     BUN   Date Value Ref Range Status   06/24/2025 19 8 - 23 mg/dL Final     Creatinine   Date Value Ref Range Status   06/24/2025 0.8 0.5 - 1.4 mg/dL Final     Calcium   Date Value Ref Range Status   06/24/2025 10.0 8.7 - 10.5 mg/dL Final   12/23/2024 9.2 8.7 - 10.5 mg/dL Final     Protein Total   Date Value Ref Range Status   06/24/2025 7.4 6.0 - 8.4 gm/dL Final   06/24/2025 7.2 6.0 - 8.4 gm/dL Final     Comment:     Serum protein electrophoresis and immunofixation results should be interpreted in clinical context in that some therapeutic agents can result in false positive results (example, daratumumab). Correlation with the patient's therapeutic regimen is required.     Total Protein   Date Value Ref Range Status   12/23/2024 7.4 6.0 - 8.4 g/dL " Final     Albumin   Date Value Ref Range Status   06/24/2025 3.9 3.5 - 5.2 g/dL Final   12/23/2024 4.2 3.5 - 5.2 g/dL Final     Total Bilirubin   Date Value Ref Range Status   12/23/2024 0.4 0.1 - 1.0 mg/dL Final     Comment:     For infants and newborns, interpretation of results should be based  on gestational age, weight and in agreement with clinical  observations.    Premature Infant recommended reference ranges:  Up to 24 hours.............<8.0 mg/dL  Up to 48 hours............<12.0 mg/dL  3-5 days..................<15.0 mg/dL  6-29 days.................<15.0 mg/dL       Bilirubin Total   Date Value Ref Range Status   06/24/2025 0.4 0.1 - 1.0 mg/dL Final     Comment:     For infants and newborns, interpretation of results should be based   on gestational age, weight and in agreement with clinical   observations.    Premature Infant recommended reference ranges:   0-24 hours:  <8.0 mg/dL   24-48 hours: <12.0 mg/dL   3-5 days:    <15.0 mg/dL   6-29 days:   <15.0 mg/dL     Alkaline Phosphatase   Date Value Ref Range Status   12/23/2024 72 38 - 126 U/L Final     ALP   Date Value Ref Range Status   06/24/2025 92 40 - 150 unit/L Final     AST   Date Value Ref Range Status   06/24/2025 21 11 - 45 unit/L Final   12/23/2024 27 15 - 46 U/L Final     ALT   Date Value Ref Range Status   06/24/2025 14 10 - 44 unit/L Final   12/23/2024 17 10 - 44 U/L Final     Anion Gap   Date Value Ref Range Status   06/24/2025 6 (L) 8 - 16 mmol/L Final     eGFR   Date Value Ref Range Status   06/24/2025 >60 >60 mL/min/1.73/m2 Final     Comment:     Estimated GFR calculated using the CKD-EPI creatinine (2021) equation.   12/23/2024 54.4 (A) >60 mL/min/1.73 m^2 Final     Lab Results   Component Value Date     06/24/2025      Immunoglobulins (IgG, IgA, IgM) Quantitative on 06/24/2025      Component  Ref Range & Units (hover) 2 wk ago   IgA Level 366 High    Comment: IgA Cord Blood Reference Range: <5 mg/dL.   IgG Level 1,230    Comment: IgG Cord Blood Reference Range: 650-1600 mg/dL.   IgM Level 121   Comment: IgM Cord Blood Reference Range: <25 mg/dL.        Protein Electrophoresis, Serum on 06/24/2025          Component  Ref Range & Units (hover) 2 wk ago  (6/24/25) 2 wk ago  (6/24/25) 1 mo ago  (6/1/25) 6 mo ago  (12/23/24) 7 yr ago  (3/26/18)   Protein Total 7.2 7.4 7.9 7.4 R 7.4 R   Comment: Serum protein electrophoresis and immunofixation results should be interpreted in clinical context in that some therapeutic agents can result in false positive results (example, daratumumab). Correlation with the patient's therapeutic regimen is required.   Alpha 1 Glob 0.27       Alpha 2 Glob 0.70       Beta Glob 0.95       Gamma Globulin 1.24       Albumin, SPE 4.04            Pathologist Interpretation SPE on 06/24/2025      Component  Ref Range & Units (hover) 2 wk ago   Pathologist Interpretation SPE Normal total protein, normal pattern.          Interpreting Pathologist: Eileen Hughes MD        Immunoglobulin Free LT Chains Blood on 06/24/2025      Component  Ref Range & Units (hover) 2 wk ago   [place] Free Light Chain 3.49 High    Kappa/Lambda FLC Ratio 1.52   Lambda Free Light Chain 2.30        Immunoglobulin TLC, Urine on 06/24/2025      Component  Ref Range & Units (hover) 2 wk ago   Kappa Total Light Chain, U <0.9000   Comment:    -------------------ADDITIONAL INFORMATION-------------------  This test has been modified from the 's  instructions. Its performance characteristics were  determined by [facility] in a manner consistent with  CLIA requirements. This test has not been cleared or  approved by the U.S. Food and Drug Administration.   Lambda Total Light Chain, U <0.7000   Comment:    -------------------ADDITIONAL INFORMATION-------------------  This test has been modified from the 's  instructions. Its performance characteristics were  determined by [facility] in a manner consistent with  CLIA requirements.  This test has not been cleared or  approved by the U.S. Food and Drug Administration.   Kappa/Lambda TLC Ratio, U SEE COMMENTS   Comment: Ratio not calculated because the Total Kappa and Total  Lambda values are less than the reportable range.     Test Performed by:  [facility]  [address] [place] [address]  : Sunday Aragon Ph.D.; CLIA# [id number]          Pathology:   None today    Imaging:   Mammo Digital Screening Bilat w/ [place] on 02/10/2025  Impression:  Bilateral  There is no mammographic evidence of malignancy.     BI-RADS Category:   Overall: 2 - Benign    Recommendation:  Routine screening mammogram in 1 year is recommended.    CT Head/Cervical Spine Without Contrast on 06/01/2025  Impression:  1. No acute intracranial abnormality.  2. No CT evidence of cervical spine acute osseous traumatic injury.  3. Minimal to mild cervical spondylosis most prominent at C4-5 level.  4. Multiple scattered subcentimeter lytic foci throughout the calvarium, cervical spine and imaged upper thoracic spine.  Findings are nonspecific but can be seen with lytic metastases or multiple myeloma.  Further evaluation with elective/nonemergent bone scan can be obtained as warranted.    Assessment:       1. Plasma protein disorder    2. Abnormal RBC indices    3. Lytic lesion of bone on x-ray    4. Exercise counseling    5. Advanced directives, counseling/discussion    6. Other specified counseling    7. Thrombocytopenia    8. Chronic anxiety    9. Alternating constipation and diarrhea      Lytic lesion of bone on x-ray:  - Incidental finding on recent CT Head/Cervical Spine on 06/01/2025 rather diffuse  - Ordered NM Bone Scan today--SLIGHT ABN TESTING MYELOMA NOT C/W MYELOMA*--WILL R/O SOLID TUMOR AND MYELOMA MAY LIGHT UP LIKEWISE--ASYMPTOMATIC--p[t has teeth grindig that sometimes cause left jaw pian and radiates up and down in facial bones from there--not  chronic and persistent--again--no fever/sweats wt loss, dec appetite or other physical sx to suggest malignancy  *ISOLATED ELVATION OF KAPPA LIGHT CHAIN--NL RATIO, eLEVATED iGa WITHOUT MONOCLONAL PROTEIN--WILL FOLLW IN 6 MO--2 F/UP SCHEDULED TODAY--SEE BELOW  - Ordered CBC, CMP, Ferritin, Immunoglobulin Free LT Chains Blood, Immunoglobulins Quantitative today  - Discussed that findings could be indicative of multiple myeloma. Counseled on differences between multiple myeloma vs smoldering myeloma vs MGUS. Discussed pathogenesis of multiple myeloma. Explained that this is a slow growing disease and often people can go several years without needing treatment. Explained that imaging might be needed after lab work up. We will consider a PET Scan vs a Bone scan. Counseled on differences between these types of imaging. Discussed possible need for bone marrow biopsy. Patient and  acknowledged understanding.    Thrombocytopenia, now absent  --no h/o bleeding    Alternating Constipation and Diarrhea:  - Patient has chronic GI issues. She mostly has constipation. She follows with GI. Recommended regular exercise as mobility improves constipation. See below.no vol c/o today    Exercise Counseling: she has begun!! reinforced  - Counseled on cardio exercise 30 min/6 days a week as an anti-cancer, anti-diabetes, anti-stroke, mood lifting, etc. May start with a few minutes per day and build up. Also counseled on weight-bearing and muscle-strengthening exercises to maintain muscle mass. Both types of exercise are life-prolonging and improve quality of life.   - Discussed reaching target heart rate (HR) for cardio exercise. To find your target cardio heart rate, you'll need to first estimate your maximum heart rate, which is generally calculated as 220 minus your age. Then, your target heart rate range for moderate intensity exercise is typically 50% to 70% of your maximum heart rate, and for vigorous intensity, it's 70% to  85%.   - First, find your estimated maximum HR (EMHR):  220 - your age = EMHR  - Next, multiply your estimated maximum heart rate by 50% and 70% for moderate intensity and 70% and 85% for vigorous intensity.    EMHR x 0.50 = Losest HR for moderate intensity workout  EMHR x 0.70 = Highest HR for moderate intensity workout    EMHR x 0.70 = Lowest HR for vigorous intensity workout  EMHR x 0.85 = Highest HR for vigorous intensity workout    Plan:     Today I counseled pt about the following:  TODAYS PLANS: FOLLOW UP, REFERRALS, TREATMENT DETAILS and DIET AND EXERCISE  Patient and  exhibit understanding of all counseling today.    RTC virtual after NM Bone Scan results AND 6 MO F/UP LIGHT CHAIN ETC--SEE ABOVE    I spent a total of 21minutes in a combination of focused physical exam and history, reviewing outside records, reviewing any available radiographs, counseling, communicating with other health care professionals regarding this patients medical condition, independently interpreting results, developing diagnostic and treatment plan and documenting in the EMR. Much time spent on differential diagnosis, pt desire for detailed info about myeloma or other possibilities of abnormal radiographs, sold tumor vs myeloma--ended up in detailed conv about myeloma, new tx, new tx if intermediate smoldering etc.  and she asked  questions--answered with exhibitied understanding and thx--affirmed frustration with time contraints of getting work-up back--reiterated myeloma is a long term dosirder and if present not an acute/right now tx problem. Anxiety went down a bit the more the education was carried out--asked pt and  to call if have forgotten questions or concerns. Note lab orders entgered by me etcmdm more complicate th an level 2 visit--mult dx followed up and dx made clear and or f/up reuired and explained/planned. Bone lesions of unclear etiology--related cousneling and plans...    Participating  Clinicians:  PCP-see story board    Disclaimer: This note was prepared using voice recognition system and is likely to have sound alike errors.  Please interpret accordingly.    Alma Delia Asif M.D.  Hematology/Oncology  [facility] [address], [address]  [place] [address]  Phone: [phone]  Fax: [phone]         [1]   Current Outpatient Medications   Medication Sig Dispense Refill    citalopram (CELEXA) 20 MG tablet TAKE 1 TABLET BY MOUTH EVERY DAY 90 tablet 3    clobetasoL (TEMOVATE) 0.05 % cream 1 application  2 (two) times daily.      estradioL (ESTRACE) 0.01 % (0.1 mg/gram) vaginal cream Place 0.5 g vaginally.      fluticasone (FLONASE) 50 mcg/actuation nasal spray 2 sprays (100 mcg total) by Each Nare route once daily. 1 Bottle 12    gabapentin (NEURONTIN) 300 MG capsule TAKE 1 CAPSULE BY MOUTH EVERY DAY AT BEDTIME 90 capsule 3    levocetirizine (XYZAL) 5 MG tablet Take 1 tablet (5 mg total) by mouth every evening. 30 tablet 11     No current facility-administered medications for this visit.

## 2025-07-15 ENCOUNTER — OFFICE VISIT (OUTPATIENT)
Dept: HEMATOLOGY/ONCOLOGY | Facility: CLINIC | Age: 65
End: 2025-07-15
Payer: COMMERCIAL

## 2025-07-15 VITALS
TEMPERATURE: 98 F | SYSTOLIC BLOOD PRESSURE: 119 MMHG | WEIGHT: 165.13 LBS | HEART RATE: 72 BPM | BODY MASS INDEX: 26.65 KG/M2 | DIASTOLIC BLOOD PRESSURE: 75 MMHG | OXYGEN SATURATION: 99 %

## 2025-07-15 DIAGNOSIS — E88.09 PLASMA PROTEIN DISORDER: Primary | ICD-10-CM

## 2025-07-15 DIAGNOSIS — R19.8 ALTERNATING CONSTIPATION AND DIARRHEA: ICD-10-CM

## 2025-07-15 DIAGNOSIS — Z71.82 EXERCISE COUNSELING: ICD-10-CM

## 2025-07-15 DIAGNOSIS — Z71.89 OTHER SPECIFIED COUNSELING: ICD-10-CM

## 2025-07-15 DIAGNOSIS — R71.8 ABNORMAL RBC INDICES: ICD-10-CM

## 2025-07-15 DIAGNOSIS — M89.8X9 LYTIC LESION OF BONE ON X-RAY: ICD-10-CM

## 2025-07-15 DIAGNOSIS — D69.6 THROMBOCYTOPENIA: ICD-10-CM

## 2025-07-15 DIAGNOSIS — F41.9 CHRONIC ANXIETY: ICD-10-CM

## 2025-07-15 PROCEDURE — 99999 PR PBB SHADOW E&M-EST. PATIENT-LVL III: CPT | Mod: PBBFAC,,, | Performed by: INTERNAL MEDICINE

## 2025-07-15 PROCEDURE — 1159F MED LIST DOCD IN RCRD: CPT | Mod: CPTII,S$GLB,, | Performed by: INTERNAL MEDICINE

## 2025-07-15 PROCEDURE — 99213 OFFICE O/P EST LOW 20 MIN: CPT | Mod: S$GLB,,, | Performed by: INTERNAL MEDICINE

## 2025-07-15 PROCEDURE — 3008F BODY MASS INDEX DOCD: CPT | Mod: CPTII,S$GLB,, | Performed by: INTERNAL MEDICINE

## 2025-07-15 PROCEDURE — 3074F SYST BP LT 130 MM HG: CPT | Mod: CPTII,S$GLB,, | Performed by: INTERNAL MEDICINE

## 2025-07-15 PROCEDURE — 1160F RVW MEDS BY RX/DR IN RCRD: CPT | Mod: CPTII,S$GLB,, | Performed by: INTERNAL MEDICINE

## 2025-07-15 PROCEDURE — 3078F DIAST BP <80 MM HG: CPT | Mod: CPTII,S$GLB,, | Performed by: INTERNAL MEDICINE

## 2025-08-04 ENCOUNTER — PATIENT MESSAGE (OUTPATIENT)
Dept: HEMATOLOGY/ONCOLOGY | Facility: CLINIC | Age: 65
End: 2025-08-04
Payer: COMMERCIAL

## 2025-08-04 ENCOUNTER — HOSPITAL ENCOUNTER (OUTPATIENT)
Dept: RADIOLOGY | Facility: HOSPITAL | Age: 65
Discharge: HOME | End: 2025-08-04
Attending: INTERNAL MEDICINE
Payer: COMMERCIAL

## 2025-08-04 DIAGNOSIS — M89.8X9 LYTIC LESION OF BONE ON X-RAY: ICD-10-CM

## 2025-08-04 DIAGNOSIS — E88.09 PLASMA PROTEIN DISORDER: ICD-10-CM

## 2025-08-04 PROCEDURE — 78306 BONE IMAGING WHOLE BODY: CPT | Mod: TC,PN

## 2025-08-04 PROCEDURE — 78306 BONE IMAGING WHOLE BODY: CPT | Mod: 26,,, | Performed by: RADIOLOGY

## 2025-08-04 PROCEDURE — A9503 TC99M MEDRONATE: HCPCS | Mod: PN | Performed by: INTERNAL MEDICINE

## 2025-08-04 RX ADMIN — TECHNETIUM TC 99M MEDRONATE 25 MILLICURIE: 20 INJECTION, POWDER, LYOPHILIZED, FOR SOLUTION INTRAVENOUS at 07:08

## 2025-08-05 ENCOUNTER — OFFICE VISIT (OUTPATIENT)
Dept: HEMATOLOGY/ONCOLOGY | Facility: CLINIC | Age: 65
End: 2025-08-05
Payer: COMMERCIAL

## 2025-08-05 ENCOUNTER — LAB VISIT (OUTPATIENT)
Dept: LAB | Facility: HOSPITAL | Age: 65
End: 2025-08-05
Attending: INTERNAL MEDICINE
Payer: COMMERCIAL

## 2025-08-05 DIAGNOSIS — R76.8 ELEVATED SERUM IMMUNOGLOBULIN FREE LIGHT CHAIN LEVEL: ICD-10-CM

## 2025-08-05 DIAGNOSIS — R76.8 HIGH TOTAL SERUM IGA: ICD-10-CM

## 2025-08-05 DIAGNOSIS — M89.8X9 LYTIC LESION OF BONE ON X-RAY: ICD-10-CM

## 2025-08-05 DIAGNOSIS — R76.8 ELEVATED ANTI-TISSUE TRANSGLUTAMINASE (TTG) IGA LEVEL: ICD-10-CM

## 2025-08-05 DIAGNOSIS — E88.09 PLASMA PROTEIN DISORDER: ICD-10-CM

## 2025-08-05 DIAGNOSIS — M89.8X9 LYTIC LESION OF BONE ON X-RAY: Primary | ICD-10-CM

## 2025-08-05 DIAGNOSIS — R71.8 ABNORMAL RBC INDICES: ICD-10-CM

## 2025-08-05 DIAGNOSIS — Z71.82 EXERCISE COUNSELING: ICD-10-CM

## 2025-08-05 DIAGNOSIS — Z71.89 OTHER SPECIFIED COUNSELING: ICD-10-CM

## 2025-08-05 LAB
CYCLIC CITRULLINATED PEPTIDE (CCP) (OHS): <0.5 U/ML
HBV CORE AB SERPL QL IA: NORMAL
HBV SURFACE AG SERPL QL IA: NORMAL
HIV 1+2 AB+HIV1 P24 AG SERPL QL IA: NORMAL
LDH SERPL-CCNC: 218 U/L (ref 110–260)

## 2025-08-05 PROCEDURE — 86704 HEP B CORE ANTIBODY TOTAL: CPT | Mod: PN

## 2025-08-05 PROCEDURE — 36415 COLL VENOUS BLD VENIPUNCTURE: CPT | Mod: PN

## 2025-08-05 PROCEDURE — 87389 HIV-1 AG W/HIV-1&-2 AB AG IA: CPT | Mod: PN

## 2025-08-05 PROCEDURE — 86235 NUCLEAR ANTIGEN ANTIBODY: CPT | Mod: PN

## 2025-08-05 PROCEDURE — 86200 CCP ANTIBODY: CPT | Mod: PN

## 2025-08-05 PROCEDURE — 84165 PROTEIN E-PHORESIS SERUM: CPT | Mod: PN

## 2025-08-05 PROCEDURE — 86225 DNA ANTIBODY NATIVE: CPT | Mod: PN

## 2025-08-05 PROCEDURE — 86235 NUCLEAR ANTIGEN ANTIBODY: CPT | Mod: 59,PN

## 2025-08-05 PROCEDURE — 87340 HEPATITIS B SURFACE AG IA: CPT | Mod: PN

## 2025-08-05 PROCEDURE — 83615 LACTATE (LD) (LDH) ENZYME: CPT | Mod: PN

## 2025-08-05 PROCEDURE — 83883 ASSAY NEPHELOMETRY NOT SPEC: CPT | Mod: 59,PN

## 2025-08-05 PROCEDURE — 86335 IMMUNFIX E-PHORSIS/URINE/CSF: CPT | Mod: PN

## 2025-08-05 PROCEDURE — 86038 ANTINUCLEAR ANTIBODIES: CPT | Mod: PN

## 2025-08-05 NOTE — PROGRESS NOTES
Virtual Visit:  The patient location is: home  The chief complaint leading to consultation is: Follow Up Regarding Bone Scan Results     Visit type: audiovisual    Face to Face time with patient: 6 minutes    Each patient to whom he or she provides medical services by telemedicine is:  (1) informed of the relationship between the physician and patient and the respective role of any other health care provider with respect to management of the patient; and (2) notified that he or she may decline to receive medical services by telemedicine and may withdraw from such care at any time.     PATIENT: Verito Perez  MRN: [id number]  DATE: 8/5/2025    Scribe Attestation: I, Irina Garcia, am scribing under the direction and in the presence of Alma Delia Asif MD. I attest that this documentation is true, accurate and complete to the best of my knowledge.      Diagnosis:   1. Lytic lesion of bone on x-ray    2. Plasma protein disorder    3. Elevated anti-tissue transglutaminase (tTG) IgA level    4. Abnormal RBC indices    5. Exercise counseling    6. Other specified counseling    7. Elevated serum immunoglobulin free light chain level    8. High total serum IgA      Chief Complaint: Lytic lesion on bone x-ray    Oncologic History:      Oncologic History     Oncologic Treatment     Pathology       Subjective:    History of Present Illness: Ms. Perez is a 64 y.o. female who presents for follow up evaluation and management of abnormal CT. Patient presented to ED on 06/01/2025 with complaints of intermittent pain and paresthesias to the LUE that had begun a few days prior. CT Cervical spine was completed which showed incidental findings of multiple scattered subcentimeter lytic foci throughout the calvarium, cervical spine, and imaged upper thoracic spine. Pain was controlled and patient was discharged home with a referral to me. FMHx step father has sleep apnea    Today, patient reports she is feeling well.    Pt denies no  abnormal lumps, bumps, or abnormal masses palpated. Pt has no fever, chills, and rigors. Appetite is good. Good energy levels. Weight stable. Pt denies headache, confusion, or Lhermitte symptomatology. No C/T/L spine or other back pain. Denies blurry vision. No changes in urinary habits. Pt has no cough or labored breathing. Denies palpitation, chest pain, anginal or pleuritic, or leg swelling. Denies history of blood clots or being on anticoagulants.    She has no history of lupus, rheumatoid arthritis, joint swelling or tenderness, skin abnormalities or rash, chronic frequent or persistent infections or any source of inflammation on very detail repeat history of present illness/review of systems.  She has no stool abnormalities, abnormal urinary behavior and symptoms, no abnormal weight loss.  She has no significant pain of any type including abdominopelvic.  She has no abnormal bleeding.    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  She denies exposure to anyone with HIV or hepatitis.  And does not report having had such infections herself.  She notes no history of liver disease.    Past medical, surgical, family, and social histories have been reviewed and updated below.    Past Medical History:   Past Medical History:   Diagnosis Date    Acne     Celiac disease     Rosacea      Past Surgical History:   Past Surgical History:   Procedure Laterality Date    CAUTERY OF TURBINATES N/A 6/18/2018    Procedure: CAUTERIZATION OF TURBINATES;  Surgeon: Yuliya Hawkins MD;  Location: [facility] OR;  Service: ENT;  Laterality: N/A;    COLONOSCOPY  4/4/13    COLONOSCOPY N/A 6/8/2023    Procedure: COLONOSCOPY;  Surgeon: Fox Marquez MD;  Location: [facility] ENDO;  Service: Endoscopy;  Laterality: N/A;    FUNCTIONAL ENDOSCOPIC SINUS SURGERY (FESS) USING COMPUTER-ASSISTED NAVIGATION N/A 6/18/2018    Procedure: SINUS SURGERY FUNCTIONAL ENDOSCOPIC  WITH NAVIGATION;  Surgeon: Yuliya Hawkins MD;  Location: [facility] OR;  Service: ENT;  Laterality: N/A;  Beau notified (he's out of town, not available) video    HYSTERECTOMY      NASAL SEPTOPLASTY N/A 6/18/2018    Procedure: SEPTOPLASTY;  Surgeon: Yuliya Hawkins MD;  Location: [facility] OR;  Service: ENT;  Laterality: N/A;    TONSILLECTOMY       Family History:   Family History   Problem Relation Name Age of Onset    Heart disease Mother      Stroke Mother      Alcohol abuse Father Tanner     Diabetes Father Tanner     Heart disease Son Jorden         tetraology of fallot    Cancer Maternal Grandmother Chary         thyroid     Social History:  reports that she has never smoked. She has never been exposed to tobacco smoke. She has never used smokeless tobacco. She reports current alcohol use of about 2.0 standard drinks of alcohol per week. She reports that she does not use drugs.    Allergies:  Review of patient's allergies indicates:   Allergen Reactions    Sulfa (sulfonamide antibiotics)      Medications:  Current Medications[1]    Review of Systems  Comprehensive ROS is negative except for what was mentioned in HPI.     ECOG Performance Status:   ECOG SCORE           Objective:      Vitals:   There were no vitals filed for this visit.      BMI: There is no height or weight on file to calculate BMI.    Physical Exam  Constitutional:       General: She is not in acute distress.     Appearance: Normal appearance.      Comments: No plethora, diaphoresis, cough, or labored breathing.   HENT:      Head: Normocephalic and atraumatic.      Comments: Hearing and eyesight grossly intact to nl sensory confrontation  Pulmonary:      Effort: Pulmonary effort is normal. No respiratory distress.   Musculoskeletal:         General: Normal range of motion.      Cervical back: Normal range of motion.   Neurological:      General: No focal deficit present.      Mental Status: She is alert and oriented to  "person, place, and time.   Psychiatric:         Mood and Affect: Mood normal.         Behavior: Behavior normal.         Thought Content: Thought content normal.         Judgment: Judgment normal.     Face and neck skin is grossly normal turgor appears normal, no rash.  She certainly has no scratching or appearance of discomfort of any type during visit including response to pruritus etc..  She appears in no distress psychologically nor physically and is quite oriented.  She exhibits good understanding of all counseling today.  I believe she was in her workplace noting the background appeared to be a school room.    Laboratory Data:  Labs have been reviewed.    Lab Results   Component Value Date    WBC 6.52 06/24/2025    RBC 5.06 06/24/2025    HGB 14.5 06/24/2025    HCT 45.9 06/24/2025    MCV 91 06/24/2025    MCH 28.7 06/24/2025    MCHC 31.6 (L) 06/24/2025    RDW 12.8 06/24/2025     06/24/2025    MPV 12.8 06/24/2025    GRAN 4.4 12/23/2024    GRAN 63.4 12/23/2024    LYMPH 16.6 (L) 06/24/2025    LYMPH 1.08 06/24/2025    MONO 7.2 06/24/2025    MONO 0.47 06/24/2025    EOS 3.5 06/24/2025    EOS 0.23 06/24/2025    BASO 0.04 12/23/2024    EOSINOPHIL 4.5 12/23/2024    BASOPHIL 0.6 06/24/2025    BASOPHIL 0.04 06/24/2025     No results found for: "UIBC", "IRON", "TRANS", "TRANSFERRIN", "TIBC", "LABIRON", "FESATURATED"   No results found for: "FERRITIN"  CMP  Sodium   Date Value Ref Range Status   06/24/2025 140 136 - 145 mmol/L Final   12/23/2024 138 136 - 145 mmol/L Final     Potassium   Date Value Ref Range Status   06/24/2025 4.9 3.5 - 5.1 mmol/L Final   12/23/2024 4.2 3.5 - 5.1 mmol/L Final     Chloride   Date Value Ref Range Status   06/24/2025 104 95 - 110 mmol/L Final   12/23/2024 101 95 - 110 mmol/L Final     CO2   Date Value Ref Range Status   06/24/2025 30 (H) 23 - 29 mmol/L Final   12/23/2024 31 (H) 23 - 29 mmol/L Final     Glucose   Date Value Ref Range Status   06/24/2025 81 70 - 110 mg/dL Final "   12/23/2024 108 70 - 110 mg/dL Final     BUN   Date Value Ref Range Status   06/24/2025 19 8 - 23 mg/dL Final     Creatinine   Date Value Ref Range Status   06/24/2025 0.8 0.5 - 1.4 mg/dL Final     Calcium   Date Value Ref Range Status   06/24/2025 10.0 8.7 - 10.5 mg/dL Final   12/23/2024 9.2 8.7 - 10.5 mg/dL Final     Protein Total   Date Value Ref Range Status   06/24/2025 7.4 6.0 - 8.4 gm/dL Final   06/24/2025 7.2 6.0 - 8.4 gm/dL Final     Comment:     Serum protein electrophoresis and immunofixation results should be interpreted in clinical context in that some therapeutic agents can result in false positive results (example, daratumumab). Correlation with the patient's therapeutic regimen is required.     Total Protein   Date Value Ref Range Status   12/23/2024 7.4 6.0 - 8.4 g/dL Final     Albumin   Date Value Ref Range Status   06/24/2025 3.9 3.5 - 5.2 g/dL Final   12/23/2024 4.2 3.5 - 5.2 g/dL Final     Total Bilirubin   Date Value Ref Range Status   12/23/2024 0.4 0.1 - 1.0 mg/dL Final     Comment:     For infants and newborns, interpretation of results should be based  on gestational age, weight and in agreement with clinical  observations.    Premature Infant recommended reference ranges:  Up to 24 hours.............<8.0 mg/dL  Up to 48 hours............<12.0 mg/dL  3-5 days..................<15.0 mg/dL  6-29 days.................<15.0 mg/dL       Bilirubin Total   Date Value Ref Range Status   06/24/2025 0.4 0.1 - 1.0 mg/dL Final     Comment:     For infants and newborns, interpretation of results should be based   on gestational age, weight and in agreement with clinical   observations.    Premature Infant recommended reference ranges:   0-24 hours:  <8.0 mg/dL   24-48 hours: <12.0 mg/dL   3-5 days:    <15.0 mg/dL   6-29 days:   <15.0 mg/dL     Alkaline Phosphatase   Date Value Ref Range Status   12/23/2024 72 38 - 126 U/L Final     ALP   Date Value Ref Range Status   06/24/2025 92 40 - 150 unit/L Final      AST   Date Value Ref Range Status   06/24/2025 21 11 - 45 unit/L Final   12/23/2024 27 15 - 46 U/L Final     ALT   Date Value Ref Range Status   06/24/2025 14 10 - 44 unit/L Final   12/23/2024 17 10 - 44 U/L Final     Anion Gap   Date Value Ref Range Status   06/24/2025 6 (L) 8 - 16 mmol/L Final     eGFR   Date Value Ref Range Status   06/24/2025 >60 >60 mL/min/1.73/m2 Final     Comment:     Estimated GFR calculated using the CKD-EPI creatinine (2021) equation.   12/23/2024 54.4 (A) >60 mL/min/1.73 m^2 Final     Lab Results   Component Value Date     06/24/2025      Immunoglobulins (IgG, IgA, IgM) Quantitative on 06/24/2025      Component  Ref Range & Units (hover) 2 wk ago   IgA Level 366 High    Comment: IgA Cord Blood Reference Range: <5 mg/dL.   IgG Level 1,230   Comment: IgG Cord Blood Reference Range: 650-1600 mg/dL.   IgM Level 121   Comment: IgM Cord Blood Reference Range: <25 mg/dL.        Protein Electrophoresis, Serum on 06/24/2025          Component  Ref Range & Units (hover) 2 wk ago  (6/24/25) 2 wk ago  (6/24/25) 1 mo ago  (6/1/25) 6 mo ago  (12/23/24) 7 yr ago  (3/26/18)   Protein Total 7.2 7.4 7.9 7.4 R 7.4 R   Comment: Serum protein electrophoresis and immunofixation results should be interpreted in clinical context in that some therapeutic agents can result in false positive results (example, daratumumab). Correlation with the patient's therapeutic regimen is required.   Alpha 1 Glob 0.27       Alpha 2 Glob 0.70       Beta Glob 0.95       Gamma Globulin 1.24       Albumin, SPE 4.04            Pathologist Interpretation SPE on 06/24/2025      Component  Ref Range & Units (hover) 2 wk ago   Pathologist Interpretation SPE Normal total protein, normal pattern.          Interpreting Pathologist: Eileen Hughes MD        Immunoglobulin Free LT Chains Blood on 06/24/2025      Component  Ref Range & Units (hover) 2 wk ago   [place] Free Light Chain 3.49 High    Kappa/Lambda FLC Ratio 1.52   Lambda Free  Light Chain 2.30        Immunoglobulin TLC, Urine on 06/24/2025      Component  Ref Range & Units (hover) 2 wk ago   Kappa Total Light Chain, U <0.9000   Comment:    -------------------ADDITIONAL INFORMATION-------------------  This test has been modified from the 's  instructions. Its performance characteristics were  determined by [facility] in a manner consistent with  CLIA requirements. This test has not been cleared or  approved by the U.S. Food and Drug Administration.   Lambda Total Light Chain, U <0.7000   Comment:    -------------------ADDITIONAL INFORMATION-------------------  This test has been modified from the 's  instructions. Its performance characteristics were  determined by [facility] in a manner consistent with  CLIA requirements. This test has not been cleared or  approved by the U.S. Food and Drug Administration.   Kappa/Lambda TLC Ratio, U SEE COMMENTS   Comment: Ratio not calculated because the Total Kappa and Total  Lambda values are less than the reportable range.     Test Performed by:  [facility]  [address] [place] [address]  : Sunday Aragon Ph.D.; CLIA# [id number]        Pathology:   None today    Imaging:   Mammo Digital Screening Bilat w/ [place] on 02/10/2025  Impression:  Bilateral  There is no mammographic evidence of malignancy.     BI-RADS Category:   Overall: 2 - Benign    Recommendation:  Routine screening mammogram in 1 year is recommended.    CT Head/Cervical Spine Without Contrast on 06/01/2025  Impression:  1. No acute intracranial abnormality.  2. No CT evidence of cervical spine acute osseous traumatic injury.  3. Minimal to mild cervical spondylosis most prominent at C4-5 level.  4. Multiple scattered subcentimeter lytic foci throughout the calvarium, cervical spine and imaged upper thoracic spine.  Findings are nonspecific but can be seen with lytic metastases or multiple  myeloma.  Further evaluation with elective/nonemergent bone scan can be obtained as warranted.    NM Bone Scan Whole Body on 08/05/2025  Impression:  There are no scintigraphic findings to suggest osteomyelitis.  Assessment:       1. Lytic lesion of bone on x-ray    2. Plasma protein disorder    3. Elevated anti-tissue transglutaminase (tTG) IgA level    4. Abnormal RBC indices    5. Exercise counseling    6. Other specified counseling    7. Elevated serum immunoglobulin free light chain level    8. High total serum IgA      Lytic lesion of bone on x-ray:  - Incidental finding on CT Head/Cervical Spine on 06/01/2025 rather diffuse-TO DATE NO EVIDENCE OF MALIGNANCY/BONE SCAN DOES NOT SUGGEST MALIGNANCY:  - Completed NM Bone Scan 08/04/2025 results show no scintigraphic findings to suggest osteomyelitis.  - ELEVATED LIGHT CHAIN WITHOUT MONOCLONAL PROTEIN AND MYELOMA TESTING URINARY AND SERUM IS NEGATIVE*    -ELEVATED IGA-I RESEARCHED CAUSES WHICH CAN BE A REACTION TO INFLAMMATION INCLUDING RHEUMATOLOGIC DISORDERS, HEPATITIS HIV ETC..  PATIENT PREFERS THAT I ORDER RELATED TESTS AND FOLLOW-UP WITH HER WITH A VIRTUAL VISIT IN A WEEK OR SO WHEN I HAD OFFERED PRIMARY CARE COULD DO THIS.  I DO NOT THINK SHE HAS HENOCH-SCHOENLEIN PURPURA, HAS NO RASH ETC..    Thrombocytopenia, now absent  --no h/o bleeding    Alternating Constipation and Diarrhea:  NO COMPLAINT TODAY  - Patient has chronic GI issues. She mostly has constipation. She follows with GI. Recommended regular exercise as mobility improves constipation. See below.no vol c/o today    Exercise Counseling: she has begun!! reinforced on last visit  - Counseled on cardio exercise 30 min/6 days a week as an anti-cancer, anti-diabetes, anti-stroke, mood lifting, etc. May start with a few minutes per day and build up. Also counseled on weight-bearing and muscle-strengthening exercises to maintain muscle mass. Both types of exercise are life-prolonging and improve quality of  life.   - Discussed reaching target heart rate (HR) for cardio exercise. To find your target cardio heart rate, you'll need to first estimate your maximum heart rate, which is generally calculated as 220 minus your age. Then, your target heart rate range for moderate intensity exercise is typically 50% to 70% of your maximum heart rate, and for vigorous intensity, it's 70% to 85%.   - First, find your estimated maximum HR (EMHR):  220 - your age = EMHR  - Next, multiply your estimated maximum heart rate by 50% and 70% for moderate intensity and 70% and 85% for vigorous intensity.    EMHR x 0.50 = Losest HR for moderate intensity workout  EMHR x 0.70 = Highest HR for moderate intensity workout    EMHR x 0.70 = Lowest HR for vigorous intensity workout  EMHR x 0.85 = Highest HR for vigorous intensity workout    Plan:     Today I counseled pt about the following:  TODAYS PLANS: FOLLOW UP, REFERRALS, TREATMENT DETAILS   Patient and  exhibit understanding of all counseling today.    RTC virtually in 1 week to discuss lab results     I spent a total of 37 minutes in a combination of focused physical exam and history, reviewing outside records, reviewing any available radiographs, counseling, communicating with other health care professionals regarding this patients medical condition, independently interpreting results, developing diagnostic and treatment plan and documenting in the EMR. 6 min w pt-chart reviewed prior lab radiographs, research as to etiologies of IgA and then communicating all of this with patient and then documentation.  Communication with nurse/office personnel about plans testing.  ALSO HAD REPEAT MYELOMA STUDIES ORDERED FOR SEPTEMBER 8TH AND ADDED A MORE EXTENSIVE PANEL AS WE WILL HAVE ONGOING FOLLOW-UP OF HER ELEVATED LIGHT CHAIN ABNORMALITY OVER TIME DEPENDING ON HOW SUCH VALUES BEHAVE AND WHETHER A SPECIFIC DIAGNOSIS AS A SOURCE OF INFLAMMATION THAT COULD CAUSE THIS REACTIVE ELEVATED [place]  LIGHT CHAIN MINIMALLY ELEVATED ETC. NOTED AND REVIEWED.* URINARY STUDIES WERE UNREMARKABLE    Participating Clinicians:  PCP-see story board    Disclaimer: This note was prepared using voice recognition system and is likely to have sound alike errors.  Please interpret accordingly.    Alma Delia Asif M.D.  Hematology/Oncology  [facility] [address], [address]  [place] [address]  Phone: [phone]  Fax: [phone]         [1]   Current Outpatient Medications   Medication Sig Dispense Refill    citalopram (CELEXA) 20 MG tablet TAKE 1 TABLET BY MOUTH EVERY DAY 90 tablet 3    clobetasoL (TEMOVATE) 0.05 % cream 1 application  2 (two) times daily.      estradioL (ESTRACE) 0.01 % (0.1 mg/gram) vaginal cream Place 0.5 g vaginally.      fluticasone (FLONASE) 50 mcg/actuation nasal spray 2 sprays (100 mcg total) by Each Nare route once daily. 1 Bottle 12    gabapentin (NEURONTIN) 300 MG capsule TAKE 1 CAPSULE BY MOUTH EVERY DAY AT BEDTIME 90 capsule 3    levocetirizine (XYZAL) 5 MG tablet Take 1 tablet (5 mg total) by mouth every evening. 30 tablet 11     No current facility-administered medications for this visit.

## 2025-08-06 LAB
ALBUMIN, SPE (OHS): 4.1 G/DL (ref 3.35–5.55)
ALPHA 1 GLOB (OHS): 0.24 GM/DL (ref 0.17–0.41)
ALPHA 2 GLOB (OHS): 0.66 GM/DL (ref 0.43–0.99)
ANA (OHS): POSITIVE
ANA PATTERN 1 (OHS): ABNORMAL
ANA TITER 1 (OHS): ABNORMAL
BETA GLOB (OHS): 0.9 GM/DL (ref 0.5–1.1)
GAMMA GLOBULIN (OHS): 1.3 GM/DL (ref 0.67–1.58)
PROT SERPL-MCNC: 7.2 GM/DL (ref 6–8.4)

## 2025-08-07 LAB
DSDNA ANTIBODY (OHS): NORMAL
DSDNA ANTIBODY TITER (OHS): NORMAL
KAPPA LC UR-MCNC: <0.9 MG/DL
KAPPA LC/LAMBDA UR: NORMAL {RATIO} (ref 0.7–6.2)
LAMBDA LC UR-MCNC: <0.7 MG/DL
PATHOLOGIST INTERPRETATION - IFE URINE (OHS): NORMAL
PATHOLOGIST REVIEW - SPE (OHS): NORMAL

## 2025-08-08 LAB
SM/RNP ANTIBODY (OHS): 0.18 RATIO
SM/RNP INTERPRETATION (OHS): NEGATIVE
SSB  ANTIBODY (OHS): 0.08 RATIO
SSB INTERPRETATION (OHS): NEGATIVE

## 2025-08-11 LAB
SM  ANTIBODY (OHS): 0.13 RATIO
SM INTERPRETATION (OHS): NEGATIVE
SSA  ANTIBODY (OHS): 0.1 RATIO (ref 0–0.99)
SSA INTERPRETATION (OHS): NEGATIVE

## 2025-08-14 ENCOUNTER — TELEPHONE (OUTPATIENT)
Dept: HEMATOLOGY/ONCOLOGY | Facility: CLINIC | Age: 65
End: 2025-08-14

## 2025-08-14 ENCOUNTER — OFFICE VISIT (OUTPATIENT)
Dept: HEMATOLOGY/ONCOLOGY | Facility: CLINIC | Age: 65
End: 2025-08-14
Payer: COMMERCIAL

## 2025-08-14 ENCOUNTER — PATIENT MESSAGE (OUTPATIENT)
Dept: HEMATOLOGY/ONCOLOGY | Facility: CLINIC | Age: 65
End: 2025-08-14

## 2025-08-14 DIAGNOSIS — R76.8 ELEVATED ANTI-TISSUE TRANSGLUTAMINASE (TTG) IGA LEVEL: ICD-10-CM

## 2025-08-14 DIAGNOSIS — R76.8 HIGH TOTAL SERUM IGA: ICD-10-CM

## 2025-08-14 DIAGNOSIS — E88.09 PLASMA PROTEIN DISORDER: ICD-10-CM

## 2025-08-14 DIAGNOSIS — R76.8 ELEVATED ANTINUCLEAR ANTIBODY (ANA) LEVEL: Primary | ICD-10-CM

## 2025-08-14 DIAGNOSIS — M89.8X9 LYTIC LESION OF BONE ON X-RAY: Primary | ICD-10-CM

## 2025-08-14 DIAGNOSIS — Z71.89 OTHER SPECIFIED COUNSELING: ICD-10-CM

## 2025-08-14 PROCEDURE — 1160F RVW MEDS BY RX/DR IN RCRD: CPT | Mod: CPTII,NDTC,, | Performed by: INTERNAL MEDICINE

## 2025-08-14 PROCEDURE — 1159F MED LIST DOCD IN RCRD: CPT | Mod: CPTII,NDTC,, | Performed by: INTERNAL MEDICINE

## 2025-08-14 PROCEDURE — 98016 BRIEF COMUNICAJ TECH-BSD SVC: CPT | Mod: NDTC,,, | Performed by: INTERNAL MEDICINE

## 2025-09-06 RX ORDER — CITALOPRAM 20 MG/1
20 TABLET ORAL
Qty: 90 TABLET | Refills: 3 | Status: SHIPPED | OUTPATIENT
Start: 2025-09-06

## (undated) DEVICE — SPONGE DERMACEA 4X4IN 12PLY

## (undated) DEVICE — POSITIONER HEAD DONUT 9IN FOAM

## (undated) DEVICE — TRACKER ENT INSTRUMENT

## (undated) DEVICE — BLADE TRICUT

## (undated) DEVICE — TRACKER PATIENT NON INVASIVE

## (undated) DEVICE — KIT ANTIFOG

## (undated) DEVICE — SEE MEDLINE ITEM 157194

## (undated) DEVICE — GLOVE BIOGEL ULTRATOUCH 6.5

## (undated) DEVICE — BLADE RED 40 CURVED SINUS 4.0M

## (undated) DEVICE — COLLECTOR SPECIMAN ARTHRO

## (undated) DEVICE — SYR 10CC LUER LOCK

## (undated) DEVICE — DRESSING NASAL INJECT MEROGEL

## (undated) DEVICE — SEE MEDLINE ITEM 157116

## (undated) DEVICE — SEE MEDLINE ITEM 157117

## (undated) DEVICE — SOL NACL 0.9% INJ 500ML BG

## (undated) DEVICE — CLOSURE SKIN STERI STRIP 1/2X4

## (undated) DEVICE — GAUZE SPONGE 4'X4 12 PLY

## (undated) DEVICE — CONTAINER MULTIPURPOSE/SPECIME

## (undated) DEVICE — SEE MEDLINE ITEM 146313

## (undated) DEVICE — SEE MEDLINE ITEM 152622

## (undated) DEVICE — PACK HEAD & NECK

## (undated) DEVICE — SPLINT NASAL AIRWAY SEPTAL SIL

## (undated) DEVICE — POSITIONER HEEL FOAM CONVOLTD

## (undated) DEVICE — SEE MEDLINE ITEM 154981

## (undated) DEVICE — BLADE INFERIOR TURBINATE 2MM

## (undated) DEVICE — CANISTER SUCTION 3000CC

## (undated) DEVICE — NDL HYPO REG 25G X 1 1/2

## (undated) DEVICE — ELECTRODE REM PLYHSV RETURN 9

## (undated) DEVICE — SPONGE NEURO 1/2 X 2

## (undated) DEVICE — KIT SINUS ENDOSCOPY PACKNG

## (undated) DEVICE — SEE L#120831

## (undated) DEVICE — SEE MEDLINE ITEM 156955

## (undated) DEVICE — COVER OVERHEAD SURG LT BLUE

## (undated) DEVICE — RAD 40 CVD SINUS BLADE

## (undated) DEVICE — SEE MEDLINE ITEM 146372

## (undated) DEVICE — ADHESIVE MASTISOL VIAL 48/BX

## (undated) DEVICE — DRESSING TELFA N ADH 3X8